# Patient Record
Sex: MALE | Race: WHITE | NOT HISPANIC OR LATINO | Employment: OTHER | URBAN - METROPOLITAN AREA
[De-identification: names, ages, dates, MRNs, and addresses within clinical notes are randomized per-mention and may not be internally consistent; named-entity substitution may affect disease eponyms.]

---

## 2024-06-14 ENCOUNTER — APPOINTMENT (EMERGENCY)
Dept: RADIOLOGY | Facility: HOSPITAL | Age: 64
DRG: 513 | End: 2024-06-14
Payer: COMMERCIAL

## 2024-06-14 ENCOUNTER — HOSPITAL ENCOUNTER (EMERGENCY)
Facility: HOSPITAL | Age: 64
Discharge: OTHER INSTITUTION | End: 2024-06-14
Attending: EMERGENCY MEDICINE
Payer: COMMERCIAL

## 2024-06-14 ENCOUNTER — HOSPITAL ENCOUNTER (INPATIENT)
Facility: HOSPITAL | Age: 64
LOS: 2 days | Discharge: HOME | DRG: 513 | End: 2024-06-16
Attending: SURGERY | Admitting: SURGERY
Payer: COMMERCIAL

## 2024-06-14 ENCOUNTER — APPOINTMENT (EMERGENCY)
Dept: RADIOLOGY | Facility: HOSPITAL | Age: 64
End: 2024-06-14
Payer: COMMERCIAL

## 2024-06-14 VITALS
WEIGHT: 188 LBS | HEART RATE: 102 BPM | BODY MASS INDEX: 27.85 KG/M2 | OXYGEN SATURATION: 94 % | TEMPERATURE: 98.7 F | DIASTOLIC BLOOD PRESSURE: 68 MMHG | RESPIRATION RATE: 20 BRPM | HEIGHT: 69 IN | SYSTOLIC BLOOD PRESSURE: 112 MMHG

## 2024-06-14 DIAGNOSIS — S63.055A: Primary | ICD-10-CM

## 2024-06-14 DIAGNOSIS — S61.502A: Primary | ICD-10-CM

## 2024-06-14 DIAGNOSIS — S63.055A CMC (CARPOMETACARPAL JOINT) DISLOCATION, LEFT, INITIAL ENCOUNTER: Primary | ICD-10-CM

## 2024-06-14 PROBLEM — I10 HTN (HYPERTENSION): Status: ACTIVE | Noted: 2024-06-14

## 2024-06-14 PROBLEM — F32.A DEPRESSION: Status: ACTIVE | Noted: 2024-06-14
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
## 2024-06-14 PROCEDURE — 96375 TX/PRO/DX INJ NEW DRUG ADDON: CPT

## 2024-06-14 PROCEDURE — NC001 PR NO CHARGE: Performed by: EMERGENCY MEDICINE

## 2024-06-14 PROCEDURE — 99222 1ST HOSP IP/OBS MODERATE 55: CPT | Performed by: SURGERY

## 2024-06-14 PROCEDURE — 99223 1ST HOSP IP/OBS HIGH 75: CPT | Performed by: ORTHOPAEDIC SURGERY

## 2024-06-14 PROCEDURE — 96365 THER/PROPH/DIAG IV INF INIT: CPT

## 2024-06-14 PROCEDURE — 73120 X-RAY EXAM OF HAND: CPT

## 2024-06-14 PROCEDURE — 73130 X-RAY EXAM OF HAND: CPT

## 2024-06-14 PROCEDURE — 96374 THER/PROPH/DIAG INJ IV PUSH: CPT

## 2024-06-14 PROCEDURE — 99284 EMERGENCY DEPT VISIT MOD MDM: CPT

## 2024-06-14 PROCEDURE — 96376 TX/PRO/DX INJ SAME DRUG ADON: CPT

## 2024-06-14 PROCEDURE — 26670 TREAT HAND DISLOCATION: CPT | Performed by: EMERGENCY MEDICINE

## 2024-06-14 PROCEDURE — 73090 X-RAY EXAM OF FOREARM: CPT

## 2024-06-14 PROCEDURE — 99285 EMERGENCY DEPT VISIT HI MDM: CPT | Performed by: EMERGENCY MEDICINE

## 2024-06-14 RX ORDER — HYDROMORPHONE HCL/PF 1 MG/ML
1 SYRINGE (ML) INJECTION ONCE
Status: COMPLETED | OUTPATIENT
Start: 2024-06-14 | End: 2024-06-14

## 2024-06-14 RX ORDER — ONDANSETRON 2 MG/ML
4 INJECTION INTRAMUSCULAR; INTRAVENOUS EVERY 4 HOURS PRN
Status: DISCONTINUED | OUTPATIENT
Start: 2024-06-14 | End: 2024-06-16 | Stop reason: HOSPADM

## 2024-06-14 RX ORDER — LORAZEPAM 2 MG/ML
0.5 INJECTION INTRAMUSCULAR ONCE
Status: DISCONTINUED | OUTPATIENT
Start: 2024-06-14 | End: 2024-06-14

## 2024-06-14 RX ORDER — LIDOCAINE HYDROCHLORIDE 10 MG/ML
10 INJECTION, SOLUTION EPIDURAL; INFILTRATION; INTRACAUDAL; PERINEURAL ONCE
Status: COMPLETED | OUTPATIENT
Start: 2024-06-14 | End: 2024-06-14

## 2024-06-14 RX ORDER — LORAZEPAM 2 MG/ML
0.5 INJECTION INTRAMUSCULAR ONCE
Status: COMPLETED | OUTPATIENT
Start: 2024-06-14 | End: 2024-06-14

## 2024-06-14 RX ORDER — LIDOCAINE HYDROCHLORIDE 10 MG/ML
20 INJECTION, SOLUTION EPIDURAL; INFILTRATION; INTRACAUDAL; PERINEURAL ONCE
Status: COMPLETED | OUTPATIENT
Start: 2024-06-14 | End: 2024-06-14

## 2024-06-14 RX ORDER — HYDROMORPHONE HCL IN WATER/PF 6 MG/30 ML
0.2 PATIENT CONTROLLED ANALGESIA SYRINGE INTRAVENOUS EVERY 4 HOURS PRN
Status: DISCONTINUED | OUTPATIENT
Start: 2024-06-14 | End: 2024-06-16 | Stop reason: HOSPADM

## 2024-06-14 RX ORDER — ACETAMINOPHEN 325 MG/1
650 TABLET ORAL EVERY 4 HOURS PRN
Status: DISCONTINUED | OUTPATIENT
Start: 2024-06-14 | End: 2024-06-16 | Stop reason: HOSPADM

## 2024-06-14 RX ORDER — OXYCODONE HYDROCHLORIDE 5 MG/1
5 TABLET ORAL EVERY 4 HOURS PRN
Status: DISCONTINUED | OUTPATIENT
Start: 2024-06-14 | End: 2024-06-16 | Stop reason: HOSPADM

## 2024-06-14 RX ORDER — CEFAZOLIN SODIUM 2 G/50ML
2000 SOLUTION INTRAVENOUS ONCE
Status: CANCELLED | OUTPATIENT
Start: 2024-06-14

## 2024-06-14 RX ORDER — CEFAZOLIN SODIUM 2 G/50ML
2000 SOLUTION INTRAVENOUS ONCE
Status: COMPLETED | OUTPATIENT
Start: 2024-06-14 | End: 2024-06-14

## 2024-06-14 RX ORDER — CEFAZOLIN SODIUM 2 G/50ML
2000 SOLUTION INTRAVENOUS EVERY 8 HOURS
Status: DISCONTINUED | OUTPATIENT
Start: 2024-06-14 | End: 2024-06-16 | Stop reason: HOSPADM

## 2024-06-14 RX ORDER — HYDROMORPHONE HCL/PF 1 MG/ML
0.5 SYRINGE (ML) INJECTION ONCE
Status: COMPLETED | OUTPATIENT
Start: 2024-06-14 | End: 2024-06-14

## 2024-06-14 RX ADMIN — LORAZEPAM 0.5 MG: 2 INJECTION INTRAMUSCULAR; INTRAVENOUS at 18:12

## 2024-06-14 RX ADMIN — SODIUM CHLORIDE 1000 ML: 0.9 INJECTION, SOLUTION INTRAVENOUS at 16:24

## 2024-06-14 RX ADMIN — HYDROMORPHONE HYDROCHLORIDE 0.5 MG: 1 INJECTION, SOLUTION INTRAMUSCULAR; INTRAVENOUS; SUBCUTANEOUS at 18:40

## 2024-06-14 RX ADMIN — LIDOCAINE HYDROCHLORIDE 10 ML: 10 INJECTION, SOLUTION EPIDURAL; INFILTRATION; INTRACAUDAL; PERINEURAL at 22:04

## 2024-06-14 RX ADMIN — LIDOCAINE HYDROCHLORIDE 10 ML: 10 INJECTION, SOLUTION EPIDURAL; INFILTRATION; INTRACAUDAL; PERINEURAL at 21:11

## 2024-06-14 RX ADMIN — CEFAZOLIN SODIUM 2000 MG: 2 SOLUTION INTRAVENOUS at 16:24

## 2024-06-14 RX ADMIN — LIDOCAINE HYDROCHLORIDE 10 ML: 10 INJECTION, SOLUTION EPIDURAL; INFILTRATION; INTRACAUDAL; PERINEURAL at 20:22

## 2024-06-14 RX ADMIN — LIDOCAINE HYDROCHLORIDE 20 ML: 10 INJECTION, SOLUTION EPIDURAL; INFILTRATION; INTRACAUDAL at 17:08

## 2024-06-14 RX ADMIN — HYDROMORPHONE HYDROCHLORIDE 0.2 MG: 0.2 INJECTION, SOLUTION INTRAMUSCULAR; INTRAVENOUS; SUBCUTANEOUS at 20:26

## 2024-06-14 RX ADMIN — HYDROMORPHONE HYDROCHLORIDE 1 MG: 1 INJECTION, SOLUTION INTRAMUSCULAR; INTRAVENOUS; SUBCUTANEOUS at 17:07

## 2024-06-14 RX ADMIN — HYDROMORPHONE HYDROCHLORIDE 1 MG: 1 INJECTION, SOLUTION INTRAMUSCULAR; INTRAVENOUS; SUBCUTANEOUS at 16:22
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
## 2024-06-15 ENCOUNTER — ANESTHESIA (INPATIENT)
Dept: PERIOP | Facility: HOSPITAL | Age: 64
DRG: 513 | End: 2024-06-15
Payer: COMMERCIAL

## 2024-06-15 ENCOUNTER — APPOINTMENT (INPATIENT)
Dept: RADIOLOGY | Facility: HOSPITAL | Age: 64
DRG: 513 | End: 2024-06-15
Payer: COMMERCIAL

## 2024-06-15 ENCOUNTER — ANESTHESIA EVENT (INPATIENT)
Dept: PERIOP | Facility: HOSPITAL | Age: 64
DRG: 513 | End: 2024-06-15
Payer: COMMERCIAL

## 2024-06-15 LAB
ABO GROUP BLD: NORMAL
ABO GROUP BLD: NORMAL
ANION GAP SERPL CALCULATED.3IONS-SCNC: 7 MMOL/L (ref 4–13)
ANION GAP SERPL CALCULATED.3IONS-SCNC: 8 MMOL/L (ref 4–13)
APTT PPP: 22 SECONDS (ref 23–37)
BASOPHILS # BLD AUTO: 0.02 THOUSANDS/ÂΜL (ref 0–0.1)
BASOPHILS # BLD AUTO: 0.02 THOUSANDS/ÂΜL (ref 0–0.1)
BASOPHILS NFR BLD AUTO: 0 % (ref 0–1)
BASOPHILS NFR BLD AUTO: 0 % (ref 0–1)
BLD GP AB SCN SERPL QL: NEGATIVE
BUN SERPL-MCNC: 13 MG/DL (ref 5–25)
BUN SERPL-MCNC: 14 MG/DL (ref 5–25)
CALCIUM SERPL-MCNC: 7.9 MG/DL (ref 8.4–10.2)
CALCIUM SERPL-MCNC: 8.1 MG/DL (ref 8.4–10.2)
CHLORIDE SERPL-SCNC: 101 MMOL/L (ref 96–108)
CHLORIDE SERPL-SCNC: 102 MMOL/L (ref 96–108)
CO2 SERPL-SCNC: 24 MMOL/L (ref 21–32)
CO2 SERPL-SCNC: 24 MMOL/L (ref 21–32)
CREAT SERPL-MCNC: 0.95 MG/DL (ref 0.6–1.3)
CREAT SERPL-MCNC: 1.05 MG/DL (ref 0.6–1.3)
EOSINOPHIL # BLD AUTO: 0 THOUSAND/ÂΜL (ref 0–0.61)
EOSINOPHIL # BLD AUTO: 0 THOUSAND/ÂΜL (ref 0–0.61)
EOSINOPHIL NFR BLD AUTO: 0 % (ref 0–6)
EOSINOPHIL NFR BLD AUTO: 0 % (ref 0–6)
ERYTHROCYTE [DISTWIDTH] IN BLOOD BY AUTOMATED COUNT: 12.7 % (ref 11.6–15.1)
ERYTHROCYTE [DISTWIDTH] IN BLOOD BY AUTOMATED COUNT: 12.9 % (ref 11.6–15.1)
GFR SERPL CREATININE-BSD FRML MDRD: 75 ML/MIN/1.73SQ M
GFR SERPL CREATININE-BSD FRML MDRD: 84 ML/MIN/1.73SQ M
GLUCOSE SERPL-MCNC: 119 MG/DL (ref 65–140)
GLUCOSE SERPL-MCNC: 153 MG/DL (ref 65–140)
HCT VFR BLD AUTO: 30.2 % (ref 36.5–49.3)
HCT VFR BLD AUTO: 31.2 % (ref 36.5–49.3)
HGB BLD-MCNC: 10.5 G/DL (ref 12–17)
HGB BLD-MCNC: 11 G/DL (ref 12–17)
IMM GRANULOCYTES # BLD AUTO: 0.03 THOUSAND/UL (ref 0–0.2)
IMM GRANULOCYTES # BLD AUTO: 0.05 THOUSAND/UL (ref 0–0.2)
IMM GRANULOCYTES NFR BLD AUTO: 0 % (ref 0–2)
IMM GRANULOCYTES NFR BLD AUTO: 1 % (ref 0–2)
INR PPP: 1.03 (ref 0.84–1.19)
LYMPHOCYTES # BLD AUTO: 0.5 THOUSANDS/ÂΜL (ref 0.6–4.47)
LYMPHOCYTES # BLD AUTO: 0.69 THOUSANDS/ÂΜL (ref 0.6–4.47)
LYMPHOCYTES NFR BLD AUTO: 5 % (ref 14–44)
LYMPHOCYTES NFR BLD AUTO: 8 % (ref 14–44)
MCH RBC QN AUTO: 33.5 PG (ref 26.8–34.3)
MCH RBC QN AUTO: 34.3 PG (ref 26.8–34.3)
MCHC RBC AUTO-ENTMCNC: 34.8 G/DL (ref 31.4–37.4)
MCHC RBC AUTO-ENTMCNC: 35.3 G/DL (ref 31.4–37.4)
MCV RBC AUTO: 97 FL (ref 82–98)
MCV RBC AUTO: 97 FL (ref 82–98)
MONOCYTES # BLD AUTO: 0.73 THOUSAND/ÂΜL (ref 0.17–1.22)
MONOCYTES # BLD AUTO: 0.87 THOUSAND/ÂΜL (ref 0.17–1.22)
MONOCYTES NFR BLD AUTO: 10 % (ref 4–12)
MONOCYTES NFR BLD AUTO: 8 % (ref 4–12)
NEUTROPHILS # BLD AUTO: 6.94 THOUSANDS/ÂΜL (ref 1.85–7.62)
NEUTROPHILS # BLD AUTO: 8.03 THOUSANDS/ÂΜL (ref 1.85–7.62)
NEUTS SEG NFR BLD AUTO: 82 % (ref 43–75)
NEUTS SEG NFR BLD AUTO: 86 % (ref 43–75)
NRBC BLD AUTO-RTO: 0 /100 WBCS
NRBC BLD AUTO-RTO: 0 /100 WBCS
PLATELET # BLD AUTO: 172 THOUSANDS/UL (ref 149–390)
PLATELET # BLD AUTO: 173 THOUSANDS/UL (ref 149–390)
PMV BLD AUTO: 10.1 FL (ref 8.9–12.7)
PMV BLD AUTO: 9.1 FL (ref 8.9–12.7)
POTASSIUM SERPL-SCNC: 4.2 MMOL/L (ref 3.5–5.3)
POTASSIUM SERPL-SCNC: 4.5 MMOL/L (ref 3.5–5.3)
PROTHROMBIN TIME: 13.4 SECONDS (ref 11.6–14.5)
RBC # BLD AUTO: 3.13 MILLION/UL (ref 3.88–5.62)
RBC # BLD AUTO: 3.21 MILLION/UL (ref 3.88–5.62)
RH BLD: NEGATIVE
RH BLD: NEGATIVE
SODIUM SERPL-SCNC: 133 MMOL/L (ref 135–147)
SODIUM SERPL-SCNC: 133 MMOL/L (ref 135–147)
SPECIMEN EXPIRATION DATE: NORMAL
WBC # BLD AUTO: 8.55 THOUSAND/UL (ref 4.31–10.16)
WBC # BLD AUTO: 9.33 THOUSAND/UL (ref 4.31–10.16)

## 2024-06-15 PROCEDURE — 86850 RBC ANTIBODY SCREEN: CPT

## 2024-06-15 PROCEDURE — 86900 BLOOD TYPING SEROLOGIC ABO: CPT

## 2024-06-15 PROCEDURE — 99232 SBSQ HOSP IP/OBS MODERATE 35: CPT | Performed by: PHYSICIAN ASSISTANT

## 2024-06-15 PROCEDURE — 80048 BASIC METABOLIC PNL TOTAL CA: CPT

## 2024-06-15 PROCEDURE — NC001 PR NO CHARGE: Performed by: ORTHOPAEDIC SURGERY

## 2024-06-15 PROCEDURE — 26686 TREAT HAND DISLOCATION: CPT | Performed by: ORTHOPAEDIC SURGERY

## 2024-06-15 PROCEDURE — C1713 ANCHOR/SCREW BN/BN,TIS/BN: HCPCS | Performed by: ORTHOPAEDIC SURGERY

## 2024-06-15 PROCEDURE — 85025 COMPLETE CBC W/AUTO DIFF WBC: CPT

## 2024-06-15 PROCEDURE — 85610 PROTHROMBIN TIME: CPT

## 2024-06-15 PROCEDURE — 0PSQ04Z REPOSITION LEFT METACARPAL WITH INTERNAL FIXATION DEVICE, OPEN APPROACH: ICD-10-PCS | Performed by: ORTHOPAEDIC SURGERY

## 2024-06-15 PROCEDURE — 85730 THROMBOPLASTIN TIME PARTIAL: CPT

## 2024-06-15 PROCEDURE — 73130 X-RAY EXAM OF HAND: CPT

## 2024-06-15 PROCEDURE — 0PSN04Z REPOSITION LEFT CARPAL WITH INTERNAL FIXATION DEVICE, OPEN APPROACH: ICD-10-PCS | Performed by: ORTHOPAEDIC SURGERY

## 2024-06-15 PROCEDURE — 86901 BLOOD TYPING SEROLOGIC RH(D): CPT

## 2024-06-15 PROCEDURE — 36415 COLL VENOUS BLD VENIPUNCTURE: CPT

## 2024-06-15 PROCEDURE — 11010 DEBRIDE SKIN AT FX SITE: CPT | Performed by: ORTHOPAEDIC SURGERY

## 2024-06-15 DEVICE — K-WIRE DIAMOND POINT BOTH ENDS                                    .062 X 5: Type: IMPLANTABLE DEVICE | Site: HAND | Status: FUNCTIONAL

## 2024-06-15 RX ORDER — PROPOFOL 10 MG/ML
INJECTION, EMULSION INTRAVENOUS AS NEEDED
Status: DISCONTINUED | OUTPATIENT
Start: 2024-06-15 | End: 2024-06-15

## 2024-06-15 RX ORDER — ONDANSETRON 2 MG/ML
INJECTION INTRAMUSCULAR; INTRAVENOUS AS NEEDED
Status: DISCONTINUED | OUTPATIENT
Start: 2024-06-15 | End: 2024-06-15

## 2024-06-15 RX ORDER — ENOXAPARIN SODIUM 100 MG/ML
30 INJECTION SUBCUTANEOUS EVERY 12 HOURS SCHEDULED
Status: DISCONTINUED | OUTPATIENT
Start: 2024-06-15 | End: 2024-06-16 | Stop reason: HOSPADM

## 2024-06-15 RX ORDER — LIDOCAINE HYDROCHLORIDE 10 MG/ML
INJECTION, SOLUTION EPIDURAL; INFILTRATION; INTRACAUDAL; PERINEURAL AS NEEDED
Status: DISCONTINUED | OUTPATIENT
Start: 2024-06-15 | End: 2024-06-15

## 2024-06-15 RX ORDER — FENTANYL CITRATE/PF 50 MCG/ML
50 SYRINGE (ML) INJECTION
Status: DISCONTINUED | OUTPATIENT
Start: 2024-06-15 | End: 2024-06-15 | Stop reason: HOSPADM

## 2024-06-15 RX ORDER — SODIUM CHLORIDE, SODIUM LACTATE, POTASSIUM CHLORIDE, CALCIUM CHLORIDE 600; 310; 30; 20 MG/100ML; MG/100ML; MG/100ML; MG/100ML
INJECTION, SOLUTION INTRAVENOUS CONTINUOUS PRN
Status: DISCONTINUED | OUTPATIENT
Start: 2024-06-15 | End: 2024-06-15

## 2024-06-15 RX ORDER — MIDAZOLAM HYDROCHLORIDE 2 MG/2ML
INJECTION, SOLUTION INTRAMUSCULAR; INTRAVENOUS AS NEEDED
Status: DISCONTINUED | OUTPATIENT
Start: 2024-06-15 | End: 2024-06-15

## 2024-06-15 RX ORDER — PROMETHAZINE HYDROCHLORIDE 25 MG/ML
25 INJECTION, SOLUTION INTRAMUSCULAR; INTRAVENOUS ONCE AS NEEDED
Status: DISCONTINUED | OUTPATIENT
Start: 2024-06-15 | End: 2024-06-15 | Stop reason: HOSPADM

## 2024-06-15 RX ORDER — KETOROLAC TROMETHAMINE 10 MG/1
10 TABLET, FILM COATED ORAL EVERY 6 HOURS PRN
Qty: 12 TABLET | Refills: 0 | Status: SHIPPED | OUTPATIENT
Start: 2024-06-15 | End: 2024-06-18

## 2024-06-15 RX ORDER — ONDANSETRON 2 MG/ML
4 INJECTION INTRAMUSCULAR; INTRAVENOUS ONCE AS NEEDED
Status: DISCONTINUED | OUTPATIENT
Start: 2024-06-15 | End: 2024-06-15 | Stop reason: HOSPADM

## 2024-06-15 RX ORDER — ACETAMINOPHEN 10 MG/ML
INJECTION, SOLUTION INTRAVENOUS AS NEEDED
Status: DISCONTINUED | OUTPATIENT
Start: 2024-06-15 | End: 2024-06-15

## 2024-06-15 RX ORDER — FENTANYL CITRATE 50 UG/ML
INJECTION, SOLUTION INTRAMUSCULAR; INTRAVENOUS AS NEEDED
Status: DISCONTINUED | OUTPATIENT
Start: 2024-06-15 | End: 2024-06-15

## 2024-06-15 RX ORDER — HYDROMORPHONE HCL/PF 1 MG/ML
SYRINGE (ML) INJECTION AS NEEDED
Status: DISCONTINUED | OUTPATIENT
Start: 2024-06-15 | End: 2024-06-15

## 2024-06-15 RX ORDER — TRAZODONE HYDROCHLORIDE 50 MG/1
50 TABLET ORAL
Status: DISCONTINUED | OUTPATIENT
Start: 2024-06-15 | End: 2024-06-16 | Stop reason: HOSPADM

## 2024-06-15 RX ORDER — PHENYLEPHRINE HCL IN 0.9% NACL 1 MG/10 ML
SYRINGE (ML) INTRAVENOUS AS NEEDED
Status: DISCONTINUED | OUTPATIENT
Start: 2024-06-15 | End: 2024-06-15

## 2024-06-15 RX ORDER — CEFAZOLIN SODIUM 1 G/3ML
INJECTION, POWDER, FOR SOLUTION INTRAMUSCULAR; INTRAVENOUS AS NEEDED
Status: DISCONTINUED | OUTPATIENT
Start: 2024-06-15 | End: 2024-06-15

## 2024-06-15 RX ORDER — DEXAMETHASONE SODIUM PHOSPHATE 10 MG/ML
INJECTION, SOLUTION INTRAMUSCULAR; INTRAVENOUS AS NEEDED
Status: DISCONTINUED | OUTPATIENT
Start: 2024-06-15 | End: 2024-06-15

## 2024-06-15 RX ORDER — BUPIVACAINE HYDROCHLORIDE 5 MG/ML
INJECTION, SOLUTION EPIDURAL; INTRACAUDAL AS NEEDED
Status: DISCONTINUED | OUTPATIENT
Start: 2024-06-15 | End: 2024-06-15 | Stop reason: HOSPADM

## 2024-06-15 RX ORDER — HYDROMORPHONE HCL/PF 1 MG/ML
0.5 SYRINGE (ML) INJECTION
Status: DISCONTINUED | OUTPATIENT
Start: 2024-06-15 | End: 2024-06-15 | Stop reason: HOSPADM

## 2024-06-15 RX ORDER — KETOROLAC TROMETHAMINE 30 MG/ML
30 INJECTION, SOLUTION INTRAMUSCULAR; INTRAVENOUS ONCE
Status: COMPLETED | OUTPATIENT
Start: 2024-06-15 | End: 2024-06-15

## 2024-06-15 RX ADMIN — HYDROMORPHONE HYDROCHLORIDE 0.25 MG: 1 INJECTION, SOLUTION INTRAMUSCULAR; INTRAVENOUS; SUBCUTANEOUS at 12:30

## 2024-06-15 RX ADMIN — HYDROMORPHONE HYDROCHLORIDE 0.25 MG: 1 INJECTION, SOLUTION INTRAMUSCULAR; INTRAVENOUS; SUBCUTANEOUS at 11:53

## 2024-06-15 RX ADMIN — SODIUM CHLORIDE, SODIUM LACTATE, POTASSIUM CHLORIDE, AND CALCIUM CHLORIDE 1000 ML: .6; .31; .03; .02 INJECTION, SOLUTION INTRAVENOUS at 22:32

## 2024-06-15 RX ADMIN — CEFAZOLIN SODIUM 2000 MG: 2 SOLUTION INTRAVENOUS at 21:44

## 2024-06-15 RX ADMIN — DEXAMETHASONE SODIUM PHOSPHATE 10 MG: 10 INJECTION, SOLUTION INTRAMUSCULAR; INTRAVENOUS at 11:27

## 2024-06-15 RX ADMIN — FENTANYL CITRATE 50 MCG: 50 INJECTION INTRAMUSCULAR; INTRAVENOUS at 11:46

## 2024-06-15 RX ADMIN — Medication 100 MCG: at 12:55

## 2024-06-15 RX ADMIN — ENOXAPARIN SODIUM 30 MG: 30 INJECTION SUBCUTANEOUS at 20:32

## 2024-06-15 RX ADMIN — LIDOCAINE HYDROCHLORIDE 50 MG: 10 INJECTION, SOLUTION EPIDURAL; INFILTRATION; INTRACAUDAL; PERINEURAL at 11:21

## 2024-06-15 RX ADMIN — HYDROMORPHONE HYDROCHLORIDE 0.2 MG: 0.2 INJECTION, SOLUTION INTRAMUSCULAR; INTRAVENOUS; SUBCUTANEOUS at 04:13

## 2024-06-15 RX ADMIN — FENTANYL CITRATE 50 MCG: 50 INJECTION INTRAMUSCULAR; INTRAVENOUS at 11:28

## 2024-06-15 RX ADMIN — PROPOFOL 200 MG: 10 INJECTION, EMULSION INTRAVENOUS at 11:21

## 2024-06-15 RX ADMIN — HYDROMORPHONE HYDROCHLORIDE 0.25 MG: 1 INJECTION, SOLUTION INTRAMUSCULAR; INTRAVENOUS; SUBCUTANEOUS at 12:44

## 2024-06-15 RX ADMIN — KETOROLAC TROMETHAMINE 30 MG: 30 INJECTION, SOLUTION INTRAMUSCULAR; INTRAVENOUS at 17:47

## 2024-06-15 RX ADMIN — ONDANSETRON 4 MG: 2 INJECTION INTRAMUSCULAR; INTRAVENOUS at 11:13

## 2024-06-15 RX ADMIN — CEFAZOLIN 2000 MG: 1 INJECTION, POWDER, FOR SOLUTION INTRAMUSCULAR; INTRAVENOUS at 11:32

## 2024-06-15 RX ADMIN — TRAZODONE HYDROCHLORIDE 50 MG: 50 TABLET ORAL at 01:10

## 2024-06-15 RX ADMIN — OXYCODONE HYDROCHLORIDE 5 MG: 5 TABLET ORAL at 07:31

## 2024-06-15 RX ADMIN — Medication 100 MCG: at 11:44

## 2024-06-15 RX ADMIN — MIDAZOLAM 2 MG: 1 INJECTION INTRAMUSCULAR; INTRAVENOUS at 11:13

## 2024-06-15 RX ADMIN — CEFAZOLIN SODIUM 2000 MG: 2 SOLUTION INTRAVENOUS at 01:11

## 2024-06-15 RX ADMIN — SODIUM CHLORIDE, SODIUM LACTATE, POTASSIUM CHLORIDE, AND CALCIUM CHLORIDE: .6; .31; .03; .02 INJECTION, SOLUTION INTRAVENOUS at 11:17

## 2024-06-15 RX ADMIN — HYDROMORPHONE HYDROCHLORIDE 0.2 MG: 0.2 INJECTION, SOLUTION INTRAMUSCULAR; INTRAVENOUS; SUBCUTANEOUS at 00:01

## 2024-06-15 RX ADMIN — Medication 100 MCG: at 11:40

## 2024-06-15 RX ADMIN — ACETAMINOPHEN 1000 MG: 10 INJECTION INTRAVENOUS at 11:04

## 2024-06-15 RX ADMIN — TRAZODONE HYDROCHLORIDE 50 MG: 50 TABLET ORAL at 20:31

## 2024-06-15 RX ADMIN — CEFAZOLIN SODIUM 2000 MG: 2 SOLUTION INTRAVENOUS at 09:33

## 2024-06-15 RX ADMIN — HYDROMORPHONE HYDROCHLORIDE 0.25 MG: 1 INJECTION, SOLUTION INTRAMUSCULAR; INTRAVENOUS; SUBCUTANEOUS at 12:04

## 2024-06-15 NOTE — PLAN OF CARE
Problem: PAIN - ADULT  Goal: Verbalizes/displays adequate comfort level or baseline comfort level  Description: Interventions:  - Encourage patient to monitor pain and request assistance  - Assess pain using appropriate pain scale  - Administer analgesics based on type and severity of pain and evaluate response  - Implement non-pharmacological measures as appropriate and evaluate response  - Consider cultural and social influences on pain and pain management  - Notify physician/advanced practitioner if interventions unsuccessful or patient reports new pain  Outcome: Progressing     Problem: INFECTION - ADULT  Goal: Absence or prevention of progression during hospitalization  Description: INTERVENTIONS:  - Assess and monitor for signs and symptoms of infection  - Monitor lab/diagnostic results  - Monitor all insertion sites, i.e. indwelling lines, tubes, and drains  - Monitor endotracheal if appropriate and nasal secretions for changes in amount and color  - [place] appropriate cooling/warming therapies per order  - Administer medications as ordered  - Instruct and encourage patient and family to use good hand hygiene technique  - Identify and instruct in appropriate isolation precautions for identified infection/condition  Outcome: Progressing  Goal: Absence of fever/infection during neutropenic period  Description: INTERVENTIONS:  - Monitor WBC    Outcome: Progressing     Problem: SAFETY ADULT  Goal: Patient will remain free of falls  Description: INTERVENTIONS:  - Educate patient/family on patient safety including physical limitations  - Instruct patient to call for assistance with activity   - Consult OT/PT to assist with strengthening/mobility   - Keep Call bell within reach  - Keep bed low and locked with side rails adjusted as appropriate  - Keep care items and personal belongings within reach  - Initiate and maintain comfort rounds  - Make Fall Risk Sign visible to staff  - Offer Toileting every 2 Hours,  in advance of need  - Initiate/Maintain bed/chair alarm  - Obtain necessary fall risk management equipment: bed/chair alarm, non-slip socks, etc.  - Apply yellow socks and bracelet for high fall risk patients  - Consider moving patient to room near nurses station  Outcome: Progressing  Goal: Maintain or return to baseline ADL function  Description: INTERVENTIONS:  -  Assess patient's ability to carry out ADLs; assess patient's baseline for ADL function and identify physical deficits which impact ability to perform ADLs (bathing, care of mouth/teeth, toileting, grooming, dressing, etc.)  - Assess/evaluate cause of self-care deficits   - Assess range of motion  - Assess patient's mobility; develop plan if impaired  - Assess patient's need for assistive devices and provide as appropriate  - Encourage maximum independence but intervene and supervise when necessary  - Involve family in performance of ADLs  - Assess for home care needs following discharge   - Consider OT consult to assist with ADL evaluation and planning for discharge  - Provide patient education as appropriate  Outcome: Progressing  Goal: Maintains/Returns to pre admission functional level  Description: INTERVENTIONS:  - Perform AM-PAC 6 Click Basic Mobility/ Daily Activity assessment daily.  - Set and communicate daily mobility goal to care team and patient/family/caregiver.   - Collaborate with rehabilitation services on mobility goals if consulted  - Perform Range of Motion 3 times a day.  - Reposition patient every 2 hours.  - Dangle patient 3 times a day  - Stand patient 3 times a day  - Ambulate patient 3 times a day  - Out of bed to chair 3 times a day   - Out of bed for meals 3 times a day  - Out of bed for toileting  - Record patient progress and toleration of activity level   Outcome: Progressing     Problem: DISCHARGE PLANNING  Goal: Discharge to home or other facility with appropriate resources  Description: INTERVENTIONS:  - Identify  barriers to discharge w/patient and caregiver  - Arrange for needed discharge resources and transportation as appropriate  - Identify discharge learning needs (meds, wound care, etc.)  - Arrange for interpretive services to assist at discharge as needed  - Refer to Case Management Department for coordinating discharge planning if the patient needs post-hospital services based on physician/advanced practitioner order or complex needs related to functional status, cognitive ability, or social support system  Outcome: Progressing     Problem: Knowledge Deficit  Goal: Patient/family/caregiver demonstrates understanding of disease process, treatment plan, medications, and discharge instructions  Description: Complete learning assessment and assess knowledge base.  Interventions:  - Provide teaching at level of understanding  - Provide teaching via preferred learning methods  Outcome: Progressing     Problem: SKIN/TISSUE INTEGRITY - ADULT  Goal: Skin Integrity remains intact(Skin Breakdown Prevention)  Description: Assess:  -Perform Chris assessment every shift  -Clean and moisturize skin every shift  -Inspect skin when repositioning, toileting, and assisting with ADLS  -Assess under medical devices such as lea every 2 hours  -Assess extremities for adequate circulation and sensation     Bed Management:  -Have minimal linens on bed & keep smooth, unwrinkled  -Change linens as needed when moist or perspiring  -Avoid sitting or lying in one position for more than 2 hours while in bed  -Keep HOB at 45 degrees     Toileting:  -Offer bedside commode  -Assess for incontinence every 2 hours  -Use incontinent care products after each incontinent episode such as moisture barrier cream    Activity:  -Mobilize patient 3 times a day  -Encourage activity and walks on unit  -Encourage or provide ROM exercises   -Turn and reposition patient every 2 Hours  -Use appropriate equipment to lift or move patient in bed  -Instruct/ Assist  with weight shifting every 1 hour when out of bed in chair  -Consider limitation of chair time 2 hour intervals    Skin Care:  -Avoid use of baby powder, tape, friction and shearing, hot water or constrictive clothing  -Relieve pressure over bony prominences using allevin foam dressings  -Do not massage red bony areas    Next Steps:  -Teach patient strategies to minimize risks such as calling for assistance   -Consider consults to  interdisciplinary teams such as PT/OT  Outcome: Progressing  Goal: Incision(s), wounds(s) or drain site(s) healing without S/S of infection  Description: INTERVENTIONS  - Assess and document dressing, incision, wound bed, drain sites and surrounding tissue  - Provide patient and family education  - Perform skin care/dressing changes every shift  Outcome: Progressing  Goal: Pressure injury heals and does not worsen  Description: Interventions:  - Implement low air loss mattress or specialty surface (Criteria met)  - Apply silicone foam dressing  - Instruct/assist with weight shifting every 30 minutes when in chair   - Limit chair time to 2 hour intervals  - Use special pressure reducing interventions such as chair cushion when in chair   - Apply fecal or urinary incontinence containment device   - Perform passive or active ROM every 2 hours  - Turn and reposition patient & offload bony prominences every 2 hours   - Utilize friction reducing device or surface for transfers   - Consider consults to  interdisciplinary teams such as PT/OT  - Use incontinent care products after each incontinent episode such as moisture barrier cream  - Consider nutrition services referral as needed  Outcome: Progressing     Problem: MUSCULOSKELETAL - ADULT  Goal: Maintain or return mobility to safest level of function  Description: INTERVENTIONS:  - Assess patient's ability to carry out ADLs; assess patient's baseline for ADL function and identify physical deficits which impact ability to perform ADLs (bathing,  care of mouth/teeth, toileting, grooming, dressing, etc.)  - Assess/evaluate cause of self-care deficits   - Assess range of motion  - Assess patient's mobility  - Assess patient's need for assistive devices and provide as appropriate  - Encourage maximum independence but intervene and supervise when necessary  - Involve family in performance of ADLs  - Assess for home care needs following discharge   - Consider OT consult to assist with ADL evaluation and planning for discharge  - Provide patient education as appropriate  Outcome: Progressing  Goal: Maintain proper alignment of affected body part  Description: INTERVENTIONS:  - Support, maintain and protect limb and body alignment  - Provide patient/ family with appropriate education  Outcome: Progressing

## 2024-06-15 NOTE — ASSESSMENT & PLAN NOTE
- Patient with chronic history of depression.  - Continue current medication regimen and resume home medication therapy as appropriate.  - Outpatient follow-up routine.
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
## 2024-06-15 NOTE — OP NOTE
OPERATIVE REPORT  PATIENT NAME: Misha Gonzalez    :  1960  MRN: [id number]  Pt Location: [facility] OR ROOM 04    SURGERY DATE: 6/15/2024    Surgeons and Role:     * Ngoc Campos MD - Primary     * Henry Gudino MD - Assisting     * Blane Peterson MD - Assisting    Preop Diagnosis:  CMC (carpometacarpal joint) dislocation, left, initial encounter [S63.365L] - long, ring, and small CMC joint dislocations    Lacerations x 2 palm of hand, radial aspect    Post-Op Diagnosis Codes:     * CMC (carpometacarpal joint) dislocation, left, initial encounter [S63.417Z] - long, ring, and small CMC joint dislocations    Procedure(s):  Left - DEBRIDEMENT HAND/FINGER (WASH OUT)  Left - OPEN REDUCTION W/ INTERNAL FIXATION (ORIF) HAND  Left - DEBRIDEMENT WOUND (WASH OUT)    Specimen(s):  * No specimens in log *    Estimated Blood Loss:   25 mL    Drains:  * No LDAs found *    Anesthesia Type:   Choice    Operative Indications:  CMC (carpometacarpal joint) dislocation, left, initial encounter [S63.052Y]  Lacerations x 2 palm of left hand    Operative Findings:  Neurovascular structures intact  Flexor tendons to index, long, and ring fingers intact.  Tendon sheaths intact.   Lacerations x 2. Laceration #1 transverse across thenar eminence measuring 4-5 cm in length, deep, involving thenar musculature.  Muscle viable.  Laceration #2 transverse, measuring 4-5 cm in length, approximately 1.5 cm distal to Laceration #1 at level of distal palmar flexion crease.  No interposed tissue between long and ring finger MC bases.  Mild venous oozing following evacuation of hematoma from distal laceration  Capillary refill brisk following procedure  Normal cascade of hand restored following reduction and pinning  Compartments soft.      Complications:   None    Procedure and Technique:  The patient was correctly identified in the preanesthesia holding area.  The operative site was identified and marked with a marker.  We reviewed the informed  consent which included the planned surgical procedure, risk, benefits, alternatives, as well as, postoperative care expectations.  Questions were answered to the patient satisfaction.  The patient voluntarily signed informed consent.    The patient was taken back to the operating room.  The patient remained on the gurney with a hand table attached to the gurney.  The patient was sedated and the anesthesiologist secured the airway.  Antibiotics were administered intravenously.  This was a third dose of Ancef received by the patient.  I applied tourniquet to the operative extremity. I removed the splint and bandages.sutures were removed from the wounds on the palm of the hand.  The lacerations were visually inspected.  There was no active bleeding from the lacerations.  Lacerations were described above in the operative findings.  There were also 2 small superficial abrasions on the dorsum of the hand.  These abrasions required nothing more than simple wound care.      The operative extremity was prepped and draped in a sterile fashion.  A timeout was performed.  I used a marker to plan the surgical incision on the dorsum of the hand overlying the CMC joints.  I used fluoroscopy to confirm the position of the incision.  First, I began irrigation and excisional debridement of nonviable skin and subcutaneous tissue.  Muscle tissue was viable.  The wounds were irrigated using normal saline delivered through cystoscopy tubing.  The wounds appeared clean with no gross contamination or foreign body.  I inspected each wound.  Findings are listed in the operative findings.      A closed reduction was performed under fluoroscopic guidance.  There was a noted mild diastases between the long and ring finger metacarpals, therefore I made a decision to make the incision on the dorsum of the hand to directly visualize the base of the long and ring finger metacarpals.   I elevated the upper extremity, applied an Esmarch, and inflated  the tourniquet to 250 mmHg.  I used a #15 blade to make the skin incision.  Careful dissection was performed using Littler scissors.  Under direct visualization I confirmed that there was no true diastases or interposed tissue between the long and the ring finger metacarpal bases.  Once this was confirmed, I decided to move forward with pin fixation of the CMC joint to prevent potential subluxation or dislocation.  0.062 K wire was inserted in the long finger metacarpal and into the capitate under fluoroscopic guidance.  The K wire engage the dorsal aspect of the capitate.  I was not able to manipulate the K wire in a more volar fashion given the size of the intramedullary canal of the long finger metacarpal.  Next, I inserted 2 additional 0.062 K wires.  The first K wire was inserted into the small finger metacarpal base and advanced into the ring, long, and index finger metacarpal bases.  The second K wire was inserted into the small finger metacarpal base and advanced in an oblique fashion into the ring finger metacarpal base and long finger metacarpal base and capitate.  Fluoroscopic images were taken to include PA, lateral, and oblique views.  CMC joint reductions were satisfactory and stable.  K wires appropriately engaged the metacarpal bases and CMC joints to prevent subluxation or dislocation.  The k-wires were bent, trimmed, and capped.  Final fluoroscopic images confirmed that CMC joint position and k-wire positions were unchanged following bending, trimming, and capping of k-wires.     My attention was returned to the lacerations and further inspection and repeat irrigation with copious amount of normal saline delivered via cysto tubing.    The tourniquet  was deflated at 51 minutes.  Hemostasis was achieved using electrocautery during the case as needed and direct pressure following closure.  I approximated the skin of each laceration and the dorsal incisional wound using 4-0 nylon in horizontal  mattress pattern. A median nerve block was performed for post-operative pain management.  A sterile dressing and a volar splint were applied to the operative extremity.  The patient tolerated the procedure well and was taken to the postanesthesia care unit in stable condition.  I, Ngoc Campos, performed the entire procedure.  Residents were involved in the surgical case and performed portions of the procedure under my direct supervision.     Postoperative plan:   The patient was instructed on activity limitations, elevation, and use of ice for pain management.    Pain management will include the use of over-the-counter Tylenol and ibuprofen or toradol.  The patient was given instructions on appropriate dosing and timing for postoperative use of Tylenol and ibuprofen or toradol.    The patient will follow-up in 10 days for removal of the splint, sutures, and to receive splint fabricated by the hand therapist as well as a instructions for pin care and home exercise program.     Patient Disposition:  PACU         SIGNATURE: Ngoc Campos MD  DATE: Sara 15, 2024  TIME: 1:05 PM

## 2024-06-15 NOTE — ANESTHESIA POSTPROCEDURE EVALUATION
Post-Op Assessment Note    CV Status:  Stable    Pain management: adequate       Mental Status:  Alert and awake   Hydration Status:  Euvolemic   PONV Controlled:  Controlled   Airway Patency:  Patent     Post Op Vitals Reviewed: Yes    No anethesia notable event occurred.    Staff: CRNA               BP   113/76   Temp   97.2   Pulse  82   Resp 12   SpO2 99

## 2024-06-15 NOTE — ASSESSMENT & PLAN NOTE
- Patient with chronic history of hypertension.  - Continue current medication regimen and resume home medication therapy as appropriate.  - Outpatient follow-up routine.
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
## 2024-06-15 NOTE — ANESTHESIA PREPROCEDURE EVALUATION
Procedure:  DEBRIDEMENT HAND/FINGER (WASH OUT) (Left: Hand)  OPEN REDUCTION W/ INTERNAL FIXATION (ORIF) HAND (Left: Hand)  DEBRIDEMENT WOUND (WASH OUT) (Left: Hand)    Relevant Problems   ANESTHESIA (within normal limits)      CARDIO   (+) HTN (hypertension)   (-) Angina at rest   (-) Angina of effort   (-) Chest pain   (-) TREVINO (dyspnea on exertion)      ENDO   (-) Diabetes mellitus type 1 (HCC)   (-) Diabetes mellitus, type 2 (HCC)      GI/HEPATIC   (-) Chronic liver disease      /RENAL   (-) Chronic kidney disease      NEURO/PSYCH   (+) Depression   (-) CVA (cerebral vascular accident) (HCC)   (-) Seizures (HCC)      PULMONARY   (-) Asthma   (-) Chronic obstructive pulmonary disease (HCC)   (-) Sleep apnea        Physical Exam    Airway    Mallampati score: II  TM Distance: >3 FB  Neck ROM: full     Dental   No notable dental hx     Cardiovascular      Pulmonary      Other Findings        Anesthesia Plan  ASA Score- 2     Anesthesia Type- general with ASA Monitors.         Additional Monitors:     Airway Plan: LMA.           Plan Factors-Exercise tolerance (METS): >4 METS.    Chart reviewed.                      Induction- intravenous.    Postoperative Plan- Plan for postoperative opioid use. Planned trial extubation    Perioperative Resuscitation Plan - Level 1 - Full Code.       Informed Consent- Anesthetic plan and risks discussed with patient.  I personally reviewed this patient with the CRNA. Discussed and agreed on the Anesthesia Plan with the CRNA..

## 2024-06-15 NOTE — PROGRESS NOTES
Orthopedics   Misha Gonzalez 63 y.o. male MRN: [id number]  Unit/Bed#: PACU 13      Subjective:  63 y.o.male post operative day 0 left hand I&D, open reduction, and percutaneous pinning.  On exam patient is resting comfortably in bed, pain is well-controlled.  He reports he is doing well overall.    Labs:  0   Lab Value Date/Time    HCT 30.2 (L) 06/15/2024 0411    HCT 31.2 (L) 06/15/2024 0031    HGB 10.5 (L) 06/15/2024 0411    HGB 11.0 (L) 06/15/2024 0031    INR 1.03 06/15/2024 0031    WBC 8.55 06/15/2024 0411    WBC 9.33 06/15/2024 0031       Meds:    Current Facility-Administered Medications:     [Transfer Hold] acetaminophen (TYLENOL) tablet 650 mg, 650 mg, Oral, Q4H PRN, Angelo Stratton MD    [Transfer Hold] ceFAZolin (ANCEF) IVPB (premix in dextrose) 2,000 mg 50 mL, 2,000 mg, Intravenous, Q8H, Shubham Salazar MD, Last Rate: 100 mL/hr at 06/15/24 0933, 2,000 mg at 06/15/24 0933    fentaNYL (SUBLIMAZE) injection 50 mcg, 50 mcg, Intravenous, Q5 Min PRN, Anmol Alfaro MD    HYDROmorphone (DILAUDID) injection 0.5 mg, 0.5 mg, Intravenous, Q5 Min PRN, Anmol Alfaro MD    [Transfer Hold] HYDROmorphone HCl (DILAUDID) injection 0.2 mg, 0.2 mg, Intravenous, Q4H PRN, Angelo Stratton MD, 0.2 mg at 06/15/24 0413    [Transfer Hold] naloxone (NARCAN) 0.04 mg/mL syringe 0.04 mg, 0.04 mg, Intravenous, Q1MIN PRN, Angelo Stratton MD    [Transfer Hold] ondansetron (ZOFRAN) injection 4 mg, 4 mg, Intravenous, Q4H PRN, Angelo Stratton MD    ondansetron (ZOFRAN) injection 4 mg, 4 mg, Intravenous, Once PRN, Anmol Alfaro MD    [Transfer Hold] oxyCODONE (ROXICODONE) split tablet 2.5 mg, 2.5 mg, Oral, Q4H PRN **OR** [Transfer Hold] oxyCODONE (ROXICODONE) IR tablet 5 mg, 5 mg, Oral, Q4H PRN, Angelo P MD Viral, 5 mg at 06/15/24 0731    promethazine (PHENERGAN) injection 25 mg, 25 mg, Intravenous, Once PRN, Anmol Alfaro MD    [Transfer Hold] traZODone (DESYREL) tablet 50 mg, 50 mg, Oral, HS PRN, Angelo P  "MD Viral, 50 mg at 06/15/24 0110    Blood Culture:   No results found for: \"BLOODCX\"    Wound Culture:   No results found for: \"WOUNDCULT\"    Ins and Outs:  I/O last 24 hours:  In: 800 [I.V.:700; IV Piggyback:100]  Out: 25 [Blood:25]          Physical Exam:  Vitals:    06/15/24 1309   BP: 113/76   Pulse: 76   Resp: 13   Temp: (!) 97.2 °F (36.2 °C)   SpO2: 99%     left Upper Extremity extremity:  Left upper extremity in volar slab splint, surgical dressings are clean, dry, intact without signs of strikethrough or saturation.  Mild tenderness  Sensation intact to exposed digits, but decreased consistent with intraoperative nerve block.  Patient able to move fingertips appropriately.  Exposed digits are warm and well-perfused with capillary refill less than 2 seconds.    Assessment: 63 y.o.male post operative day 0 left hand I&D and open reduction percutaneous pinning.  Doing well    Plan:  NWB L hand  May move fingers within confines of bandage  Analgesics  Ice  Will continue to assess for acute blood loss anemia  Please follow up in clinic with Dr. Campos in 7-10 days after surgery    Blane Peterson MD              "

## 2024-06-15 NOTE — DISCHARGE INSTR - AVS FIRST PAGE
Post Operative Instructions    You have had surgery on your arm today, please read and follow the information below:  Elevate your hand above your elbow during the next 24-48 hours to help with swelling.  Place your hand and arm over your head with motion at your shoulder three times a day.  Do not apply any cream/ointment/oil to your incisions including antibiotics.  Do not soak your hands in standing water (dishwater, tubs, Jacuzzi's, pools, etc.) until given permission (typically 2-3 weeks after injury)    Call the office if you notice any:  Increased numbness or tingling of your hand or fingers that is not relieved with elevation.  Increasing pain that is not controlled with medication.  Difficulty chewing, breathing, swallowing.  Chest pains or shortness of breath.  Fever over 101.4 degrees.    Bandage: Do NOT remove bandage until follow-up appointment.    Motion: you may move your fingers within the confines of the bandage    Weight bearing status: The operated extremity should be non-weight bearing until further notice.    Ice: Ice for 10 minutes every hour as needed for swelling x 24 hours.    Sling: Please use your sling as needed for comfort. While using the sling, make sure to move your shoulder throughout the day to prevent stiffness here.     Medications:   Tylenol and Toradol- You may take over-the-counter Tylenol and Toradol.  Tylenol dosing can be either Extra Strength (500 mg) or 2 tablets of Regular Strength (650 mg). You may take toradol for 3 days after which you may continue with Ibuprofen instead. Ibuprofen dosing can be up to 3 tablets of 200 mg for a total of 600mg.  Timing is as follows:  Take Tylenol, then 3 hours later take ibuprofen, then 3 hours later take Tylenol, then 3 hours later take ibuprofen.  Every 3 hours, you will take either Tylenol or ibuprofen.  Antibiotics will be prescribed only if indicated.        Follow-up Appointment: 7-10 days. With Dr. Campos.      Please call the  office if you have any questions or concerns regarding your post-operative care.

## 2024-06-15 NOTE — ASSESSMENT & PLAN NOTE
- Patient with multiple open left hand dislocations, present on presentation.  - X-ray left hand demonstrated: Second through fifth CMC joint dislocations. No definite fracture though limited by overlapping bones.   - Follow-up x-ray shows possible fracture of base of fourth metacarpal.  - Appreciated Hand/Orthopedic surgery evaluation, recommendations and assistance with management.  -Status post reduction and splinting at outside hospital.  - N.p.o. 4 OR today.  - Monitor neurovascular exam.  - Continue multimodal analgesic regimen.  - Maintain splint and nonweightbearing status on the left upper extremity.  - Reevaluate postoperatively.  - Will need outpatient follow-up with hand surgery and likely outpatient therapy services.
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
## 2024-06-16 VITALS
RESPIRATION RATE: 18 BRPM | TEMPERATURE: 98.3 F | WEIGHT: 188 LBS | BODY MASS INDEX: 27.85 KG/M2 | DIASTOLIC BLOOD PRESSURE: 68 MMHG | HEIGHT: 69 IN | HEART RATE: 81 BPM | OXYGEN SATURATION: 97 % | SYSTOLIC BLOOD PRESSURE: 105 MMHG

## 2024-06-16 LAB
ANION GAP SERPL CALCULATED.3IONS-SCNC: 7 MMOL/L (ref 4–13)
BUN SERPL-MCNC: 11 MG/DL (ref 5–25)
CALCIUM SERPL-MCNC: 7.9 MG/DL (ref 8.4–10.2)
CHLORIDE SERPL-SCNC: 103 MMOL/L (ref 96–108)
CO2 SERPL-SCNC: 27 MMOL/L (ref 21–32)
CREAT SERPL-MCNC: 0.9 MG/DL (ref 0.6–1.3)
GFR SERPL CREATININE-BSD FRML MDRD: 90 ML/MIN/1.73SQ M
GLUCOSE SERPL-MCNC: 107 MG/DL (ref 65–140)
POTASSIUM SERPL-SCNC: 4.2 MMOL/L (ref 3.5–5.3)
SODIUM SERPL-SCNC: 137 MMOL/L (ref 135–147)

## 2024-06-16 PROCEDURE — NC001 PR NO CHARGE: Performed by: ORTHOPAEDIC SURGERY

## 2024-06-16 PROCEDURE — 97166 OT EVAL MOD COMPLEX 45 MIN: CPT

## 2024-06-16 PROCEDURE — 99238 HOSP IP/OBS DSCHRG MGMT 30/<: CPT | Performed by: PHYSICIAN ASSISTANT

## 2024-06-16 PROCEDURE — 80048 BASIC METABOLIC PNL TOTAL CA: CPT

## 2024-06-16 RX ORDER — ACETAMINOPHEN 325 MG/1
650 TABLET ORAL EVERY 4 HOURS PRN
Start: 2024-06-16

## 2024-06-16 RX ORDER — OXYCODONE HYDROCHLORIDE 5 MG/1
2.5-5 TABLET ORAL EVERY 4 HOURS PRN
Qty: 18 TABLET | Refills: 0 | Status: SHIPPED | OUTPATIENT
Start: 2024-06-16 | End: 2024-06-19

## 2024-06-16 RX ADMIN — CEFAZOLIN SODIUM 2000 MG: 2 SOLUTION INTRAVENOUS at 05:03

## 2024-06-16 RX ADMIN — ENOXAPARIN SODIUM 30 MG: 30 INJECTION SUBCUTANEOUS at 09:45

## 2024-06-16 NOTE — ASSESSMENT & PLAN NOTE
- Patient with multiple open left hand dislocations, present on presentation.  - X-ray left hand demonstrated: Second through fifth CMC joint dislocations. No definite fracture though limited by overlapping bones.   - Follow-up x-ray shows possible fracture of base of fourth metacarpal.  - Appreciated Hand/Orthopedic surgery evaluation, recommendations and assistance with management.  -Status post reduction and splinting at outside hospital.  - Status left hand wound debridement and washout with ORIF of multiple carpometacarpal dislocations on 6/15/2024.  - Monitor neurovascular exam.  - Continue multimodal analgesic regimen.  - Maintain splint and nonweightbearing status on the left upper extremity.  - Will need outpatient follow-up with hand surgery and likely outpatient therapy services.
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
## 2024-06-16 NOTE — PROGRESS NOTES
Progress Note - Orthopedics   Misha Gonzalez 63 y.o. male MRN: [id number]  Unit/Bed#: [facility] 622-01      Subjective:    63 y.o.male POD 1 L hand I&D, open reduction and percutaneous pinning. No acute events, no new complaints. Denies fevers, chills, CP, SOB, N/V, numbness. Pain well controlled. Patient states he's doing well overall and only notes some minor tingling to the long finger distal phalanx improved relative to yesterday after receiving the nerve block.    Labs:  0   Lab Value Date/Time    HCT 30.2 (L) 06/15/2024 0411    HCT 31.2 (L) 06/15/2024 0031    HGB 10.5 (L) 06/15/2024 0411    HGB 11.0 (L) 06/15/2024 0031    INR 1.03 06/15/2024 0031    WBC 8.55 06/15/2024 0411    WBC 9.33 06/15/2024 0031       Meds:    Current Facility-Administered Medications:     acetaminophen (TYLENOL) tablet 650 mg, 650 mg, Oral, Q4H PRN, Blane Peterson MD    ceFAZolin (ANCEF) IVPB (premix in dextrose) 2,000 mg 50 mL, 2,000 mg, Intravenous, Q8H, Blane Peterson MD, Last Rate: 100 mL/hr at 06/16/24 0503, 2,000 mg at 06/16/24 0503    enoxaparin (LOVENOX) subcutaneous injection 30 mg, 30 mg, Subcutaneous, Q12H [facility], 30 mg at 06/15/24 2032    HYDROmorphone HCl (DILAUDID) injection 0.2 mg, 0.2 mg, Intravenous, Q4H PRN, Blane Peterson MD, 0.2 mg at 06/15/24 0413    lactated ringers bolus 1,000 mL, 1,000 mL, Intravenous, Once PRN, Last Rate: 1,000 mL/hr at 06/15/24 2232, 1,000 mL at 06/15/24 2232 **AND** lactated ringers bolus 1,000 mL, 1,000 mL, Intravenous, Once PRN, Blane Peterson MD    naloxone (NARCAN) 0.04 mg/mL syringe 0.04 mg, 0.04 mg, Intravenous, Q1MIN PRN, Blane Peterson MD    ondansetron (ZOFRAN) injection 4 mg, 4 mg, Intravenous, Q4H PRN, Blane Peterson MD    oxyCODONE (ROXICODONE) split tablet 2.5 mg, 2.5 mg, Oral, Q4H PRN **OR** oxyCODONE (ROXICODONE) IR tablet 5 mg, 5 mg, Oral, Q4H PRN, Blane Peterson MD, 5 mg at 06/15/24 0731    sodium chloride 0.9 % bolus 1,000 mL, 1,000 mL, Intravenous, Once PRN **AND** sodium chloride 0.9  "% bolus 1,000 mL, 1,000 mL, Intravenous, Once PRN, Blane Peterson MD    traZODone (DESYREL) tablet 50 mg, 50 mg, Oral, HS PRN, Blane Peterson MD, 50 mg at 06/15/24 2031    Blood Culture:   No results found for: \"BLOODCX\"    Wound Culture:   No results found for: \"WOUNDCULT\"    Ins and Outs:  I/O last 24 hours:  In: 800 [I.V.:700; IV Piggyback:100]  Out: 25 [Blood:25]          Physical:  Vitals:    06/16/24 0302   BP: 102/63   Pulse: 74   Resp:    Temp:    SpO2: 94%       Musculoskeletal: left Upper Extremity  Left upper extremity in volar slab splint, surgical dressings are clean, dry, intact without signs of strikethrough or saturation.  Mild tenderness  Sensation intact to exposed digits.  Patient able to move fingertips appropriately.  Exposed digits are warm and well-perfused with capillary refill less than 2 seconds.      Assessment:    63 y.o.male post operative day 1 left hand I&D and open reduction percutaneous pinning.  Doing well .     Plan:  NWB L hand  May move fingers within confines of bandage  Analgesics  Ice  Will continue to assess for acute blood loss anemia  Please follow up in clinic with Dr. Campos in 7-10 days after surgery    Blane Peterson MD    "

## 2024-06-16 NOTE — PLAN OF CARE
Problem: PAIN - ADULT  Goal: Verbalizes/displays adequate comfort level or baseline comfort level  Description: Interventions:  - Encourage patient to monitor pain and request assistance  - Assess pain using appropriate pain scale  - Administer analgesics based on type and severity of pain and evaluate response  - Implement non-pharmacological measures as appropriate and evaluate response  - Consider cultural and social influences on pain and pain management  - Notify physician/advanced practitioner if interventions unsuccessful or patient reports new pain  Outcome: Adequate for Discharge     Problem: INFECTION - ADULT  Goal: Absence or prevention of progression during hospitalization  Description: INTERVENTIONS:  - Assess and monitor for signs and symptoms of infection  - Monitor lab/diagnostic results  - Monitor all insertion sites, i.e. indwelling lines, tubes, and drains  - Monitor endotracheal if appropriate and nasal secretions for changes in amount and color  - [place] appropriate cooling/warming therapies per order  - Administer medications as ordered  - Instruct and encourage patient and family to use good hand hygiene technique  - Identify and instruct in appropriate isolation precautions for identified infection/condition  Outcome: Adequate for Discharge  Goal: Absence of fever/infection during neutropenic period  Description: INTERVENTIONS:  - Monitor WBC    Outcome: Adequate for Discharge     Problem: SAFETY ADULT  Goal: Patient will remain free of falls  Description: INTERVENTIONS:  - Educate patient/family on patient safety including physical limitations  - Instruct patient to call for assistance with activity   - Consult OT/PT to assist with strengthening/mobility   - Keep Call bell within reach  - Keep bed low and locked with side rails adjusted as appropriate  - Keep care items and personal belongings within reach  - Initiate and maintain comfort rounds  - Make Fall Risk Sign visible to  staff  - Offer Toileting every 2 Hours, in advance of need  - Initiate/Maintain bed/chair alarm  - Obtain necessary fall risk management equipment:   - Apply yellow socks and bracelet for high fall risk patients  - Consider moving patient to room near nurses station  Outcome: Adequate for Discharge  Goal: Maintain or return to baseline ADL function  Description: INTERVENTIONS:  -  Assess patient's ability to carry out ADLs; assess patient's baseline for ADL function and identify physical deficits which impact ability to perform ADLs (bathing, care of mouth/teeth, toileting, grooming, dressing, etc.)  - Assess/evaluate cause of self-care deficits   - Assess range of motion  - Assess patient's mobility; develop plan if impaired  - Assess patient's need for assistive devices and provide as appropriate  - Encourage maximum independence but intervene and supervise when necessary  - Involve family in performance of ADLs  - Assess for home care needs following discharge   - Consider OT consult to assist with ADL evaluation and planning for discharge  - Provide patient education as appropriate  Outcome: Adequate for Discharge  Goal: Maintains/Returns to pre admission functional level  Description: INTERVENTIONS:  - Perform AM-PAC 6 Click Basic Mobility/ Daily Activity assessment daily.  - Set and communicate daily mobility goal to care team and patient/family/caregiver.   - Collaborate with rehabilitation services on mobility goals if consulted  - Perform Range of Motion 3 times a day.  - Reposition patient every 2 hours.  - Dangle patient 3 times a day  - Stand patient 3 times a day  - Ambulate patient 3 times a day  - Out of bed to chair 3 times a day   - Out of bed for meals 3 times a day  - Out of bed for toileting  - Record patient progress and toleration of activity level   Outcome: Adequate for Discharge     Problem: DISCHARGE PLANNING  Goal: Discharge to home or other facility with appropriate  resources  Description: INTERVENTIONS:  - Identify barriers to discharge w/patient and caregiver  - Arrange for needed discharge resources and transportation as appropriate  - Identify discharge learning needs (meds, wound care, etc.)  - Arrange for interpretive services to assist at discharge as needed  - Refer to Case Management Department for coordinating discharge planning if the patient needs post-hospital services based on physician/advanced practitioner order or complex needs related to functional status, cognitive ability, or social support system  Outcome: Adequate for Discharge     Problem: Knowledge Deficit  Goal: Patient/family/caregiver demonstrates understanding of disease process, treatment plan, medications, and discharge instructions  Description: Complete learning assessment and assess knowledge base.  Interventions:  - Provide teaching at level of understanding  - Provide teaching via preferred learning methods  Outcome: Adequate for Discharge     Problem: SKIN/TISSUE INTEGRITY - ADULT  Goal: Skin Integrity remains intact(Skin Breakdown Prevention)  Description: Assess:  -Perform Chris assessment every shift  -Clean and moisturize skin every shift  -Inspect skin when repositioning, toileting, and assisting with ADLS  -Assess under medical devices such as lea every shift  -Assess extremities for adequate circulation and sensation     Bed Management:  -Have minimal linens on bed & keep smooth, unwrinkled  -Change linens as needed when moist or perspiring  -Avoid sitting or lying in one position for more than 2 hours while in bed  -Keep HOB at 45degrees     Toileting:  -Offer bedside commode  -Assess for incontinence every shift  -Use incontinent care products after each incontinent episode such as     Activity:  -Mobilize patient 3 times a day  -Encourage activity and walks on unit  -Encourage or provide ROM exercises   -Turn and reposition patient every 3 Hours  -Use appropriate equipment to  lift or move patient in bed  -Instruct/ Assist with weight shifting every 3 when out of bed in chair  -Consider limitation of chair time 3 hour intervals    Skin Care:  -Avoid use of baby powder, tape, friction and shearing, hot water or constrictive clothing  -Relieve pressure over bony prominences using alevin  -Do not massage red bony areas    Next Steps:  -Teach patient strategies to minimize risks such as    -Consider consults to  interdisciplinary teams such as   Outcome: Adequate for Discharge  Goal: Incision(s), wounds(s) or drain site(s) healing without S/S of infection  Description: INTERVENTIONS  - Assess and document dressing, incision, wound bed, drain sites and surrounding tissue  - Provide patient and family education  - Perform skin care/dressing changes every shift  Outcome: Adequate for Discharge  Goal: Pressure injury heals and does not worsen  Description: Interventions:  - Implement low air loss mattress or specialty surface (Criteria met)  - Apply silicone foam dressing  - Instruct/assist with weight shifting every 20 minutes when in chair   - Limit chair time to 2 hour intervals  - Use special pressure reducing interventions such as  when in chair   - Apply fecal or urinary incontinence containment device   - Perform passive or active ROM every shift  - Turn and reposition patient & offload bony prominences every 2 hours   - Utilize friction reducing device or surface for transfers   - Consider consults to  interdisciplinary teams such as   - Use incontinent care products after each incontinent episode such as   - Consider nutrition services referral as needed  Outcome: Adequate for Discharge     Problem: MUSCULOSKELETAL - ADULT  Goal: Maintain or return mobility to safest level of function  Description: INTERVENTIONS:  - Assess patient's ability to carry out ADLs; assess patient's baseline for ADL function and identify physical deficits which impact ability to perform ADLs (bathing, care of  mouth/teeth, toileting, grooming, dressing, etc.)  - Assess/evaluate cause of self-care deficits   - Assess range of motion  - Assess patient's mobility  - Assess patient's need for assistive devices and provide as appropriate  - Encourage maximum independence but intervene and supervise when necessary  - Involve family in performance of ADLs  - Assess for home care needs following discharge   - Consider OT consult to assist with ADL evaluation and planning for discharge  - Provide patient education as appropriate  Outcome: Adequate for Discharge  Goal: Maintain proper alignment of affected body part  Description: INTERVENTIONS:  - Support, maintain and protect limb and body alignment  - Provide patient/ family with appropriate education  Outcome: Adequate for Discharge

## 2024-06-16 NOTE — QUICK NOTE
"Acute Care Surgery   Post-Op Check Progress Note   Misha Gonzalez 63 y.o. male MRN: [id number]  Unit/Bed#: [facility] [place] 622-01 Encounter: [id number]    Assessment and Plan:    63-year-old male with multiple left hand open carpometacarpal joint dislocations status post left hand wound debridement, washout and ORIF on 6/15/2024.   - Diet as tolerated.   - Maintain nonweightbearing status on the left upper extremity with splint in place.   - Continue multimodal analgesic regimen.   - Outpatient therapy evaluation and treatment as indicated.   - Encouraged incentive spirometry use.      Subjective/Objective     Subjective: Patient reports that he feels pretty good postoperatively.  His pain is well-controlled at this time.  He is tolerating postoperative oral intake without nausea or vomiting.    Objective:     Blood pressure 102/63, pulse 74, temperature 97.9 °F (36.6 °C), resp. rate 18, height 5' 9\" (1.753 m), weight 85.3 kg (188 lb), SpO2 94%.,Body mass index is 27.76 kg/m².      Intake/Output Summary (Last 24 hours) at 6/16/2024 0616  Last data filed at 6/15/2024 1303  Gross per 24 hour   Intake 800 ml   Output 25 ml   Net 775 ml       Invasive Devices       Peripheral Intravenous Line  Duration             Peripheral IV 06/15/24 Right Forearm 1 day                    Physical Exam:    GENERAL APPEARANCE: Patient in no acute distress.  HEENT: NCAT; EOMs intact; Mucous membranes moist  CV: Regular rate and rhythm; + S1, S2; no murmur/gallops/rubs appreciated.  LUNGS: Clear to auscultation; no wheezes/rales/rhonci.  ABD: NABS; soft; non-distended; non-tender.  EXT: +2 pulses bilaterally upper & lower extremities; no clubbing/cyanosis.  Moderate swelling in the exposed distal left hand and fingers with mild left hand tenderness and a clean/dry/intact postoperative dressing overlying splint.  NEURO: GCS 15; no focal neurologic deficits; neurovascularly intact.  SKIN: Warm, dry and well perfused; no rash; no jaundice.  Ecchymosis of " the distal exposed hand and fingers.                Mark Roper PA-C  6/16/2024
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
## 2024-06-17 ENCOUNTER — OFFICE VISIT (OUTPATIENT)
Dept: OBGYN CLINIC | Facility: CLINIC | Age: 64
End: 2024-06-17
Payer: COMMERCIAL

## 2024-06-17 ENCOUNTER — NURSE TRIAGE (OUTPATIENT)
Age: 64
End: 2024-06-17

## 2024-06-17 ENCOUNTER — TELEPHONE (OUTPATIENT)
Age: 64
End: 2024-06-17

## 2024-06-17 VITALS — WEIGHT: 188 LBS | BODY MASS INDEX: 27.85 KG/M2 | HEIGHT: 69 IN

## 2024-06-17 DIAGNOSIS — Z98.890 POSTOPERATIVE STATE: Primary | ICD-10-CM

## 2024-06-17 PROCEDURE — 29125 APPL SHORT ARM SPLINT STATIC: CPT | Performed by: STUDENT IN AN ORGANIZED HEALTH CARE EDUCATION/TRAINING PROGRAM

## 2024-06-17 PROCEDURE — 99024 POSTOP FOLLOW-UP VISIT: CPT | Performed by: STUDENT IN AN ORGANIZED HEALTH CARE EDUCATION/TRAINING PROGRAM
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
## 2024-06-17 NOTE — TELEPHONE ENCOUNTER
Caller: Misha    Doctor: Beth    Reason for call: Patient's bandage is bleeding through.  And he said it looks like it is still bleeding a bit. Patient had SX on Saturday The Oxycodone was never sent to the pharmacy.  Please advise patient    Call back#: [phone]

## 2024-06-17 NOTE — PROGRESS NOTES
Assessment/Plan:  Patient ID: 63 y.o. male   Surgery: Debridement Hand/finger (wash Out) - Left, Open Reduction W/ Internal Fixation (orif) Hand - Left, and Debridement Wound (wash Out) - Left performed by Dr. Campos.  Date of Surgery: 6/15/2024    The patient is doing well postoperatively. Discussed with the patient that the bleeding was likely due to swelling from the proximal pin site. There is no drainage on exam today. A new volar slab splint was applied in the office today. Clinical images were taken and uploaded into the patient's chart.    Follow Up:  Next week with Dr. Campos    To Do Next Visit:        CHIEF COMPLAINT:  Chief Complaint   Patient presents with   • Left Hand - Pain         SUBJECTIVE:  Patient is a 63 y.o. year old male who presents for follow up after Debridement Hand/finger (wash Out) - Left, Open Reduction W/ Internal Fixation (orif) Hand - Left, and Debridement Wound (wash Out) - Left performed by Dr. Campos. Today patient states when he woke up this morning he noticed bleeding through his bandage.       PHYSICAL EXAMINATION:  General: Well developed and well nourished, alert and oriented x 3, appears comfortable  Psychiatric: Normal    MUSCULOSKELETAL EXAMINATION:  Incision: Clean, dry, intact no drainage. Tiny amount of serous fluid from proximal pin site  Surgery Site: normal, no evidence of infection   Range of Motion: As expected  Neurovascular status: Neuro intact, good cap refill         STUDIES REVIEWED:  No new studies to review       PROCEDURES PERFORMED:  Splint application    Date/Time: 6/17/2024 4:45 PM    Performed by: Ye Garrett MD  Authorized by: Ye Garrett MD  Universal Protocol:  Consent: Verbal consent obtained.  Consent given by: patient  Patient identity confirmed: verbally with patient    Procedure details:     Laterality:  Left    Splint type:  Volar short arm    Supplies:  Cotton padding, elastic bandage and Ortho-Glass  Post-procedure details:      Patient tolerance of procedure:  Tolerated well, no immediate complications         Scribe Attestation    I,:  Shirley Gabriel MA am acting as a scribe while in the presence of the attending physician.:       I,:  Ye Garrett MD personally performed the services described in this documentation    as scribed in my presence.:

## 2024-06-17 NOTE — TELEPHONE ENCOUNTER
"Patient calling regarding surgery.  Patient referred to Orthopedics who did surgery.    Answer Assessment - Initial Assessment Questions  1. REASON FOR CALL or QUESTION: \"What is your reason for calling today?\" or \"How can I best help you?\" or \"What question do you have that I can help answer?\"      Patient called wrong number.    Protocols used: Information Only Call - No Triage-ADULT-OH    "

## 2024-06-18 NOTE — ED PROCEDURE NOTE
"PROCEDURE  Orthopedic injury treatment    Date/Time: 6/14/2024 6:00 PM    Performed by: Valentino Sierra DO  Authorized by: Valentino Sierra DO    Patient Location:  ED  [place] Protocol:  Procedure performed by:  Consent: The procedure was performed in an emergent situation. Verbal consent obtained. Written consent not obtained.  Risks and benefits: risks, benefits and alternatives were discussed  Consent given by: patient  Time out: Immediately prior to procedure a \"time out\" was called to verify the correct patient, procedure, equipment, support staff and site/side marked as required.  Patient understanding: patient states understanding of the procedure being performed  Patient consent: the patient's understanding of the procedure matches consent given  Procedure consent: procedure consent matches procedure scheduled  Relevant documents: relevant documents present and verified  Test results: test results available and properly labeled  Site marked: the operative site was marked  Radiology Images displayed and confirmed. If images not available, report reviewed: imaging studies available  Patient identity confirmed: verbally with patient and arm band    Injury location:  Hand  Location details:  Left hand  Injury type:  Dislocation  Dislocation type: carpometacarpal (finger)    Dislocation type: carpometacarpal (finger)    Distal perfusion: normal    Neurological function: normal    Range of motion: reduced    Local anesthesia used?: Yes    General anesthesia used?: No    Anesthesia:  Local infiltration  Local anesthetic:  Lidocaine 1% without epinephrine  Manipulation performed?: Yes    Skeletal traction used?: No    Reduction successful?: No    Confirmation: Reduction confirmed by x-ray    Immobilization:  Splint and ace wrap  Supplies used:  Elastic bandage  Distal perfusion: normal    Neurological function: normal    Range of motion: normal    Patient tolerance:  Patient tolerated the procedure well with no " immediate complications   Unable to reduce carpal/metacarpal 2-4.        Valentino Sierra, DO  06/18/24 0454

## 2024-06-18 NOTE — UTILIZATION REVIEW
NOTIFICATION OF ADMISSION DISCHARGE   This is a Notification of Discharge from [facility]. Please be advised that this patient has been discharge from our facility. Below you will find the admission and discharge date and time including the patient’s disposition.   UTILIZATION REVIEW CONTACT:  Suleiman Jordan  Utilization   Network Utilization Review Department  Phone: [phone] x carefully listen to the prompts. All voicemails are confidential.  Email: [email or web address]     ADMISSION INFORMATION  PRESENTATION DATE: 6/14/2024  7:38 PM  OBERVATION ADMISSION DATE:   INPATIENT ADMISSION DATE: 6/14/24  8:59 PM   DISCHARGE DATE: 6/16/2024 11:08 AM   DISPOSITION:Home/Self Care    Network Utilization Review Department  ATTENTION: Please call with any questions or concerns to [phone] and carefully listen to the prompts so that you are directed to the right person. All voicemails are confidential.   For Discharge needs, contact Care Management DC Support Team at [phone] opt. 2  Send all requests for admission clinical reviews, approved or denied determinations and any other requests to dedicated fax number below belonging to the campus where the patient is receiving treatment. List of dedicated fax numbers for the Facilities:  FACILITY NAME UR FAX NUMBER   ADMISSION DENIALS (Administrative/Medical Necessity) [phone]   DISCHARGE SUPPORT TEAM ([facility]) [phone]   PARENT CHILD HEALTH (Maternity/NICU/Pediatrics) [phone]   [facility] [phone]   [facility] [phone]   [facility] [phone]   [facility] [phone]   Critical access hospital [phone]   [facility] [phone]   [facility] [phone]   [facility] [phone]   [facility]  [facility] [phone]   [facility] [phone]   [facility] [phone]   [facility] [phone]

## 2024-06-25 ENCOUNTER — OFFICE VISIT (OUTPATIENT)
Dept: OCCUPATIONAL THERAPY | Facility: CLINIC | Age: 64
End: 2024-06-25
Payer: COMMERCIAL

## 2024-06-25 ENCOUNTER — OFFICE VISIT (OUTPATIENT)
Dept: OBGYN CLINIC | Facility: CLINIC | Age: 64
End: 2024-06-25

## 2024-06-25 VITALS
BODY MASS INDEX: 27.85 KG/M2 | DIASTOLIC BLOOD PRESSURE: 80 MMHG | SYSTOLIC BLOOD PRESSURE: 118 MMHG | HEIGHT: 69 IN | WEIGHT: 188 LBS

## 2024-06-25 DIAGNOSIS — S63.055A CMC (CARPOMETACARPAL JOINT) DISLOCATION, LEFT, INITIAL ENCOUNTER: Primary | ICD-10-CM

## 2024-06-25 DIAGNOSIS — Z98.890 POSTOPERATIVE STATE: ICD-10-CM

## 2024-06-25 DIAGNOSIS — Z98.890 POSTOPERATIVE STATE: Primary | ICD-10-CM

## 2024-06-25 PROCEDURE — 97760 ORTHOTIC MGMT&TRAING 1ST ENC: CPT | Performed by: OCCUPATIONAL THERAPIST

## 2024-06-25 PROCEDURE — 99024 POSTOP FOLLOW-UP VISIT: CPT | Performed by: ORTHOPAEDIC SURGERY

## 2024-06-25 NOTE — PATIENT INSTRUCTIONS
Instructions    Elevate your hand above your elbow  to help with swelling.  Place your hand and arm over your head with motion at your shoulder three times a day.  Do not apply any cream/ointment/oil to your incisions including antibiotics.  Do not soak your hands in standing water (dishwater, tubs, Jacuzzi's, pools, etc.) until given permission (typically 2-3 weeks after injury)    Call the office if you notice any:  Increased numbness or tingling of your hand or fingers that is not relieved with elevation.  Increasing pain that is not controlled with medication.  Difficulty chewing, breathing, swallowing.  Chest pains or shortness of breath.  Fever over 101.4 degrees.      Motion: The therapist will give you instructions for motion of your fingers and wrist.  Wear the splint as instructed by the therapist.  Follow pin care as instructed by the therapist.    Weight bearing status: The operated extremity should be non-weight bearing until further notice.      Medications:   Tylenol and Ibuprofen- You may take over-the-counter Tylenol and Ibuprofen.  Tylenol dosing can be either Extra Strength (500 mg) or 2 tablets of Regular Strength (650 mg). Ibuprofen dosing can be up to 3 tablets of 200 mg for a total of 600mg.  Timing is as follows:  Take Tylenol, then 3 hours later take ibuprofen, then 3 hours later take Tylenol, then 3 hours later take ibuprofen.  Every 3 hours, you will take either Tylenol or ibuprofen.         Follow-up Appointment: 4 weeks with Dr. Campos      Please call the office if you have any questions or concerns regarding your post-operative care.

## 2024-06-25 NOTE — PROGRESS NOTES
"Assessment:   S/P Debridement Hand/finger (wash Out) - Left, Open Reduction W/ Internal Fixation (orif) Hand - Left, and Debridement Wound (wash Out) - Left on 6/15/2024    Plan:   Therapy for resting hand splint, pin care, and wound care.  Home exercise program will be given to the patient to work on wrist range of motion and IP joint range of motion.  Edema management.    For pain management, the patient was given the instructions for use of Tylenol and ibuprofen.  Those were printed in the after visit summary.    Follow Up:  4  week(s)    To Do Next Visit:    and X-rays of the  left  hand      CHIEF COMPLAINT:  Chief Complaint   Patient presents with    Left Hand - Follow-up, Pain     Constant pain, trouble sleeping         SUBJECTIVE:  Misha Gonzalez is a 63 y.o. male who presents for follow up after Debridement Hand/finger (wash Out) - Left, Open Reduction W/ Internal Fixation (orif) Hand - Left, and Debridement Wound (wash Out) - Left on 6/15/2024.  Today patient has Pain  Moderate  Intermittant  Aching.       PHYSICAL EXAMINATION:  Vital signs: /80 (BP Location: Right arm, Patient Position: Sitting, Cuff Size: Adult)   Ht 5' 9\" (1.753 m)   Wt 85.3 kg (188 lb)   BMI 27.76 kg/m²   General: well developed and well nourished, alert, oriented times 3, and appears comfortable  Psychiatric: Normal    MUSCULOSKELETAL EXAMINATION:  Incision:  healed wounds on palm of hand.  Dorsal surgical wound healed.  Range of Motion: As expected, Limited due to pain, and Limited due to stiffness  Neurovascular status: Neuro intact, good cap refill  Activity Restrictions: No use of left hand other than activities/home exercise program given to patient by hand therapist    Sutures removed from the dorsal wound today.  Sutures will remain intact for the wounds on the palm of the hand.         STUDIES REVIEWED:  No Studies to review      PROCEDURES PERFORMED:  Procedures  No Procedures performed today   "

## 2024-06-25 NOTE — PROGRESS NOTES
Orthosis    Diagnosis:   1. CMC (carpometacarpal joint) dislocation, left, initial encounter        2. Postoperative state  Ambulatory Referral to Occupational Therapy        Indication: Motion Blocking    Location: Left  wrist, hand, index finger, long finger, ring finger, and small finger  Supplies: Custom Fit Orthotic, Skin coverage , and Dressing   Orthosis type:  resting hand FB with digits   Wearing Schedule: Remove with Protected Technique Only as Needed  Describe Position: resting     Precautions: Universal (skin contact/breakdown)    Patient or Caregiver expresses understanding of wearing Schedule and Precautions? Yes  Patient or Caregiver able to don/doff orthotic independently?Yes    Written orders provided to patient? Yes  Orders Obtained: Verbal/written  Orders Obtained from: Dr Campos     Return for evaluation and treatment yes

## 2024-07-01 ENCOUNTER — EVALUATION (OUTPATIENT)
Dept: OCCUPATIONAL THERAPY | Facility: CLINIC | Age: 64
End: 2024-07-01
Payer: COMMERCIAL

## 2024-07-01 DIAGNOSIS — S63.055A CMC (CARPOMETACARPAL JOINT) DISLOCATION, LEFT, INITIAL ENCOUNTER: Primary | ICD-10-CM

## 2024-07-01 DIAGNOSIS — Z98.890 POST-OPERATIVE STATE: ICD-10-CM

## 2024-07-01 PROCEDURE — 97166 OT EVAL MOD COMPLEX 45 MIN: CPT
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
## 2024-07-03 ENCOUNTER — OFFICE VISIT (OUTPATIENT)
Dept: OCCUPATIONAL THERAPY | Facility: CLINIC | Age: 64
End: 2024-07-03
Payer: COMMERCIAL

## 2024-07-03 DIAGNOSIS — S63.055A CMC (CARPOMETACARPAL JOINT) DISLOCATION, LEFT, INITIAL ENCOUNTER: Primary | ICD-10-CM

## 2024-07-03 DIAGNOSIS — Z98.890 POST-OPERATIVE STATE: ICD-10-CM

## 2024-07-03 PROCEDURE — 97110 THERAPEUTIC EXERCISES: CPT

## 2024-07-03 PROCEDURE — 97140 MANUAL THERAPY 1/> REGIONS: CPT

## 2024-07-03 NOTE — PROGRESS NOTES
Daily Note     Today's date: 7/3/2024  Patient name: Misha Gonzalez  : 1960  MRN: [id number]  Referring provider: Ngoc Campos,*  Dx:   Encounter Diagnosis     ICD-10-CM    1. CMC (carpometacarpal joint) dislocation, left, initial encounter  S63.055A       2. Post-operative state  Z98.890           Start Time: 08  Stop Time: 09  Total time in clinic (min): 55 minutes    Subjective: Patient notes consistency with wound care, edema management and ROM.           Objective: See treatment diary below.    Incision is CDI. No signs of infection noted this date.     Left:  Thumb AROM:    Extension:  CMC: 12 degrees  MP: 8 degrees  IP: 0 degrees    Flexion:  CMC: 2 degrees  MP: 40 degrees  IP: 48 degrees      Radial abduction: 43 degrees  Palmar abduction: 45 degrees       Precautions:     Tubigrip Size F   S/P Debridement Hand/finger (wash Out) - Left, Open Reduction W/ Internal Fixation (orif) Hand - Left, and Debridement Wound (wash Out) - Left on 6/15/2024     Therapy for resting hand splint, pin care, and wound care.  Home exercise program will be given to the patient to work on wrist range of motion and IP joint range of motion.  Edema management.    *NWB L hand/wrist*    Fabrication of resting hand splint.  Thumb free.  Ips and be free.  Scar massage once sutures out.  Leave sutures in for one more week  Pin care instructions  Edema management     Modalities per therapist's discretion     ROM:  Elbow, forearm, wrist (radiocarpal joint) and fingers   __x__ Active  __x__ Active assist  __x__ Passive- fingers  ____ Resistive/Strengthening      Auth Tracker  Auth Status Total   Visits  Expiration date Co-Insurance   Pending Pending Pending                                Visit Tracker: AUTH REQ. BOMN. Copay: $25  Date   IE 7/3                                                                                     PMHx:   has a past medical history of Anxiety and Hypertension.         Manuals HEP 7/3/2024    STM/edema management  x15min                  Ther Ex     Education on HEP and dx  x5min   AROM hook fist x x10   AROM wrist flex/ext/RD/UD x x10   AROM forearm rotation x x10   Wound care management  x10min   Towel scrunches  x2                  Modalities     MHP  x5min          Assessment:   Patient tolerated session well. Session focused on HEP education, wound care management, range of motion, and patient education  to improve functional task performance with daily activities. Patient tolerated all MT and TE with good tolerance and moderate complaints of pain when performing digit ROM towel scrunches. Patient progressing well towards goals. Patient benefiting from skilled hand therapy OT to reduce deficits to improve independence with daily activities.        Plan:   Focus on AROM, AAROM, PROM of fingers, custom orthotic splint, train and fit orthotics, edema management, scar massage, pin care hygiene management, and all modalities as seen fit to improve ability to complete daily activites with ease.    POC 7/1/2024 - 8/12/2024

## 2024-07-08 ENCOUNTER — OFFICE VISIT (OUTPATIENT)
Dept: OCCUPATIONAL THERAPY | Facility: CLINIC | Age: 64
End: 2024-07-08
Payer: COMMERCIAL

## 2024-07-08 DIAGNOSIS — S63.055A CMC (CARPOMETACARPAL JOINT) DISLOCATION, LEFT, INITIAL ENCOUNTER: Primary | ICD-10-CM

## 2024-07-08 DIAGNOSIS — Z98.890 POSTOPERATIVE STATE: ICD-10-CM

## 2024-07-08 DIAGNOSIS — Z98.890 POST-OPERATIVE STATE: ICD-10-CM

## 2024-07-08 PROCEDURE — 97110 THERAPEUTIC EXERCISES: CPT

## 2024-07-08 PROCEDURE — 97140 MANUAL THERAPY 1/> REGIONS: CPT

## 2024-07-08 NOTE — PROGRESS NOTES
Daily Note     Today's date: 2024  Patient name: Misha Gonzalez  : 1960  MRN: [id number]  Referring provider: Ngoc Campos,*  Dx:   Encounter Diagnosis     ICD-10-CM    1. CMC (carpometacarpal joint) dislocation, left, initial encounter  S63.055A       2. Post-operative state  Z98.890       3. Postoperative state  Z98.890           Start Time: 1025  Stop Time: 1125  Total time in clinic (min): 60 minutes    Subjective: Patient notes consistency with wound care, edema management and ROM. Patient arrived to today's session without hand splint.           Objective: See treatment diary below.    Removal of sutures this date. Noted area on the palmar/volar side of the hand pale yellow cloudy serous drainage noted after debridement of incision after suture removal. Contacted Dr. Campos via Secure chat.            Precautions:     Tubigrip Size F   S/P Debridement Hand/finger (wash Out) - Left, Open Reduction W/ Internal Fixation (orif) Hand - Left, and Debridement Wound (wash Out) - Left on 6/15/2024     Therapy for resting hand splint, pin care, and wound care.  Home exercise program will be given to the patient to work on wrist range of motion and IP joint range of motion.  Edema management.    *NWB L hand/wrist*    Fabrication of resting hand splint.  Thumb free.  Ips and be free.  Scar massage once sutures out.  Leave sutures in for one more week  Pin care instructions  Edema management     Modalities per therapist's discretion     ROM:  Elbow, forearm, wrist (radiocarpal joint) and fingers   __x__ Active  __x__ Active assist  __x__ Passive- fingers  ____ Resistive/Strengthening      Auth Tracker  Auth Status Total   Visits  Expiration date Co-Insurance   Approved 24                                Visit Tracker: AUTH REQ. BOMN. Copay: $25  Date   IE 7/3 7/8            X                                                                        PMHx:   has a past medical history of Anxiety  and Hypertension.         Manuals HEP 7/8/2024   STM/edema management  x15min                  Ther Ex     Education on HEP and dx  x5min   Therapist assisted AAROM of wrist/hand and gentle PASSIVE fingers  x8min   AROM hook fist x x10   AROM wrist flex/ext/RD/UD x x10   AROM forearm rotation x x10   Wound care management/removal of sutures  x25min   Towel scrunches  x5                  Modalities     MHP  x5min          Assessment:   Patient tolerated session well. Session focused on HEP education, wound care management, range of motion, and patient education  to improve functional task performance with daily activities. Patient tolerated all MT and TE with good tolerance and minimal complaints of pain when performing digit ROM towel scrunches. Educated patient on the importance of wearing the splint for protection and only remove for wound care management, hygiene and therapy HEP. Patient verbalized understanding of this. Patient progressing well towards goals. Patient benefiting from skilled hand therapy OT to reduce deficits to improve independence with daily activities.        Plan:   Focus on AROM, AAROM, PROM of fingers, custom orthotic splint, train and fit orthotics, edema management, scar massage, pin care hygiene management, and all modalities as seen fit to improve ability to complete daily activites with ease.    POC 7/1/2024 - 8/12/2024

## 2024-07-10 ENCOUNTER — OFFICE VISIT (OUTPATIENT)
Dept: OCCUPATIONAL THERAPY | Facility: CLINIC | Age: 64
End: 2024-07-10
Payer: COMMERCIAL

## 2024-07-10 DIAGNOSIS — Z98.890 POSTOPERATIVE STATE: ICD-10-CM

## 2024-07-10 DIAGNOSIS — Z98.890 POST-OPERATIVE STATE: ICD-10-CM

## 2024-07-10 DIAGNOSIS — S63.055A CMC (CARPOMETACARPAL JOINT) DISLOCATION, LEFT, INITIAL ENCOUNTER: Primary | ICD-10-CM

## 2024-07-10 PROCEDURE — 97140 MANUAL THERAPY 1/> REGIONS: CPT

## 2024-07-10 PROCEDURE — 97110 THERAPEUTIC EXERCISES: CPT

## 2024-07-10 NOTE — PROGRESS NOTES
Daily Note     Today's date: 7/10/2024  Patient name: Misha Gonzalez  : 1960  MRN: [id number]  Referring provider: Ngoc Campos,*  Dx:   Encounter Diagnosis     ICD-10-CM    1. CMC (carpometacarpal joint) dislocation, left, initial encounter  S63.055A       2. Post-operative state  Z98.890       3. Postoperative state  Z98.890           Start Time: 0800  Stop Time: 0845  Total time in clinic (min): 45 minutes    Subjective: Patient reports increased pain on the dorsum side of the hand at the beginning of today's session. Patient arrived to today's session without hand splint.           Objective: See treatment diary below.             Precautions:     Tubigrip Size F   S/P Debridement Hand/finger (wash Out) - Left, Open Reduction W/ Internal Fixation (orif) Hand - Left, and Debridement Wound (wash Out) - Left on 6/15/2024     Therapy for resting hand splint, pin care, and wound care.  Home exercise program will be given to the patient to work on wrist range of motion and IP joint range of motion.  Edema management.    *NWB L hand/wrist*    Fabrication of resting hand splint.  Thumb free.  Ips and be free.  Scar massage once sutures out.  Leave sutures in for one more week  Pin care instructions  Edema management     Modalities per therapist's discretion     ROM:  Elbow, forearm, wrist (radiocarpal joint) and fingers   __x__ Active  __x__ Active assist  __x__ Passive- fingers  ____ Resistive/Strengthening      Auth Tracker  Auth Status Total   Visits  Expiration date Co-Insurance   Approved 24                                Visit Tracker: AUTH REQ. BOMN. Copay: $25  Date   IE 7/3 7/8 7/10           X                                                                        PMHx:   has a past medical history of Anxiety and Hypertension.         Manuals HEP 7/10/2024   STM/edema management  x10min                  Ther Ex     Education on HEP and dx  x5min   Therapist assisted AAROM of wrist/hand  and gentle PASSIVE fingers  x8min   AROM hook fist x x10   AROM wrist flex/ext/RD/UD x x10   AROM forearm rotation x x10   Wound care management  x5min   Towel scrunches  x10   Juxaciser  x5   Functional dexterity wooden pegs IHM  Whole container - 16 pegs   Wrist & forearm AROM w/ flexbar  x15   Gentle sponge squeezes x Yellow  x10-15        Modalities     MHP  x5min          Assessment:   Patient tolerated session well. Session focused on HEP education, wound care management, range of motion, and patient education  to improve functional task performance with daily activities. Patient tolerated all MT and TE with good tolerance and minimal complaints of pain when performing digit ROM towel scrunches. Educated patient on the importance of wearing the splint for protection and only remove for wound care management, hygiene and therapy HEP. Patient verbalized understanding of this. Patient progressing well towards goals. Patient benefiting from skilled hand therapy OT to reduce deficits to improve independence with daily activities.        Plan:   Focus on AROM, AAROM, PROM of fingers, custom orthotic splint, train and fit orthotics, edema management, scar massage, pin care hygiene management, and all modalities as seen fit to improve ability to complete daily activites with ease.    POC 7/1/2024 - 8/12/2024

## 2024-07-15 ENCOUNTER — OFFICE VISIT (OUTPATIENT)
Dept: OCCUPATIONAL THERAPY | Facility: CLINIC | Age: 64
End: 2024-07-15
Payer: COMMERCIAL

## 2024-07-15 DIAGNOSIS — S63.055A CMC (CARPOMETACARPAL JOINT) DISLOCATION, LEFT, INITIAL ENCOUNTER: Primary | ICD-10-CM

## 2024-07-15 DIAGNOSIS — Z98.890 POSTOPERATIVE STATE: ICD-10-CM

## 2024-07-15 DIAGNOSIS — Z98.890 POST-OPERATIVE STATE: ICD-10-CM

## 2024-07-15 PROCEDURE — 97140 MANUAL THERAPY 1/> REGIONS: CPT

## 2024-07-15 PROCEDURE — 97110 THERAPEUTIC EXERCISES: CPT

## 2024-07-15 NOTE — PROGRESS NOTES
Daily Note     Today's date: 7/15/2024  Patient name: Misha Gonzalez  : 1960  MRN: [id number]  Referring provider: Ngoc Campos,*  Dx:   Encounter Diagnosis     ICD-10-CM    1. CMC (carpometacarpal joint) dislocation, left, initial encounter  S63.055A       2. Post-operative state  Z98.890       3. Postoperative state  Z98.890           Start Time: 835  Stop Time: 935  Total time in clinic (min): 60 minutes    Subjective: Patient reports hitting the pin on the ulnar side of the hand on his dresser last night and the pin moved. Patient notes that he was not wearing the protective splint when this incident occurred. Patient arrived to today's session wearing the custom splint to today's session.           Objective: See treatment diary below.             Precautions:     Tubigrip Size F   S/P Debridement Hand/finger (wash Out) - Left, Open Reduction W/ Internal Fixation (orif) Hand - Left, and Debridement Wound (wash Out) - Left on 6/15/2024     Therapy for resting hand splint, pin care, and wound care.  Home exercise program will be given to the patient to work on wrist range of motion and IP joint range of motion.  Edema management.    *NWB L hand/wrist*    Fabrication of resting hand splint.  Thumb free.  Ips and be free.  Scar massage once sutures out.  Leave sutures in for one more week  Pin care instructions  Edema management     Modalities per therapist's discretion     ROM:  Elbow, forearm, wrist (radiocarpal joint) and fingers   __x__ Active  __x__ Active assist  __x__ Passive- fingers  ____ Resistive/Strengthening      Auth Tracker  Auth Status Total   Visits  Expiration date Co-Insurance   Approved 24                                Visit Tracker: AUTH REQ. BOMN. Copay: $25  Date   IE 7/3 7/8 7/10 7/15        RE  X                                                                        PMHx:   has a past medical history of Anxiety and Hypertension.         Manuals HEP 7/15/2024    STM/edema management  x10min                  Ther Ex     Education on HEP and dx  x5min   Therapist assisted AAROM of wrist/hand and gentle PASSIVE fingers  x10min   AROM hook fist x x10   AROM wrist flex/ext/RD/UD x x10   AROM forearm rotation x x10   Wound care management  x5min   Towel scrunches  x10   Juxaciser  x5   Functional dexterity wooden pegs IHM  Whole container - 16 pegs   Wrist & forearm AROM w/ flexbar  x15   Gentle sponge squeezes x Yellow  x10-15   Dice, Opposition  Whole container   Elbow ROM with cones  1 cycle - 10 cones        Modalities     MHP  x5min          Assessment:   Patient tolerated session well. Session focused on HEP education, wound care management, range of motion, and patient education  to improve functional task performance with daily activities. Patient tolerated all MT and TE with good tolerance and minimal complaints of pain requiring rest breaks as needed.  Educated patient on the importance of wearing the splint for protection and only remove for wound care management, hygiene and therapy HEP. Patient verbalized understanding of this. Patient progressing well towards goals. Patient benefiting from skilled hand therapy OT to reduce deficits to improve independence with daily activities.        Plan:   Focus on AROM, AAROM, PROM of fingers, custom orthotic splint, train and fit orthotics, edema management, scar massage, pin care hygiene management, and all modalities as seen fit to improve ability to complete daily activites with ease.    POC 7/1/2024 - 8/12/2024

## 2024-07-17 ENCOUNTER — APPOINTMENT (OUTPATIENT)
Dept: OCCUPATIONAL THERAPY | Facility: CLINIC | Age: 64
End: 2024-07-17
Payer: COMMERCIAL

## 2024-07-19 ENCOUNTER — OFFICE VISIT (OUTPATIENT)
Dept: OCCUPATIONAL THERAPY | Facility: CLINIC | Age: 64
End: 2024-07-19
Payer: COMMERCIAL

## 2024-07-19 DIAGNOSIS — Z98.890 POSTOPERATIVE STATE: ICD-10-CM

## 2024-07-19 DIAGNOSIS — Z98.890 POST-OPERATIVE STATE: ICD-10-CM

## 2024-07-19 DIAGNOSIS — S63.055A CMC (CARPOMETACARPAL JOINT) DISLOCATION, LEFT, INITIAL ENCOUNTER: Primary | ICD-10-CM

## 2024-07-19 PROCEDURE — 97110 THERAPEUTIC EXERCISES: CPT

## 2024-07-19 NOTE — PROGRESS NOTES
Daily Note     Today's date: 2024  Patient name: Misha Gonzalez  : 1960  MRN: [id number]  Referring provider: Ngoc Campos,*  Dx:   Encounter Diagnosis     ICD-10-CM    1. CMC (carpometacarpal joint) dislocation, left, initial encounter  S63.055A       2. Post-operative state  Z98.890       3. Postoperative state  Z98.890           Start Time: 09  Stop Time: 1015  Total time in clinic (min): 40 minutes    Subjective: Patient offers no functional changes this date.         Objective: See treatment diary below.             Precautions:     Tubigrip Size F   S/P Debridement Hand/finger (wash Out) - Left, Open Reduction W/ Internal Fixation (orif) Hand - Left, and Debridement Wound (wash Out) - Left on 6/15/2024     Therapy for resting hand splint, pin care, and wound care.  Home exercise program will be given to the patient to work on wrist range of motion and IP joint range of motion.  Edema management.    *NWB L hand/wrist*    Fabrication of resting hand splint.  Thumb free.  Ips and be free.  Scar massage once sutures out.  Leave sutures in for one more week  Pin care instructions  Edema management     Modalities per therapist's discretion     ROM:  Elbow, forearm, wrist (radiocarpal joint) and fingers   __x__ Active  __x__ Active assist  __x__ Passive- fingers  ____ Resistive/Strengthening      Auth Tracker  Auth Status Total   Visits  Expiration date Co-Insurance   Approved 12 24                                Visit Tracker: AUTH REQ. BOMN. Copay: $25  Date   IE 7/3 7/8 7/10 7/15 7/19       RE  X                                                                        PMHx:   has a past medical history of Anxiety and Hypertension.         Manuals HEP 2024   STM/edema management                    Ther Ex     Education on HEP and dx  x5min   Therapist assisted AAROM of wrist/hand and gentle PASSIVE fingers  x10min   AROM hook fist x x10   AROM wrist flex/ext/RD/UD x x10   AROM  forearm rotation x x10   Wound care management  x5min   Towel scrunches  x10   Juxaciser  x5   Functional dexterity wooden pegs IHM  Whole container - 16 pegs   Wrist & forearm AROM w/ flexbar     Gentle sponge squeezes x HEP    Yellow  x10-15   Dice, Opposition  Whole container   Elbow ROM with cones          Modalities     MHP  x5min          Assessment:   Patient tolerated session well. Session focused on HEP education, wound care management, range of motion, and patient education  to improve functional task performance with daily activities. Patient tolerated all MT and TE with good tolerance and minimal complaints of pain requiring rest breaks as needed.  Patient progressing well towards goals. Patient benefiting from skilled hand therapy OT to reduce deficits to improve independence with daily activities.        Plan:   Focus on AROM, AAROM, PROM of fingers, custom orthotic splint, train and fit orthotics, edema management, scar massage, pin care hygiene management, and all modalities as seen fit to improve ability to complete daily activites with ease.    POC 7/1/2024 - 8/12/2024

## 2024-07-22 ENCOUNTER — OFFICE VISIT (OUTPATIENT)
Dept: OCCUPATIONAL THERAPY | Facility: CLINIC | Age: 64
End: 2024-07-22
Payer: COMMERCIAL

## 2024-07-22 DIAGNOSIS — S63.055A CMC (CARPOMETACARPAL JOINT) DISLOCATION, LEFT, INITIAL ENCOUNTER: Primary | ICD-10-CM

## 2024-07-22 DIAGNOSIS — Z98.890 POSTOPERATIVE STATE: ICD-10-CM

## 2024-07-22 DIAGNOSIS — Z98.890 POST-OPERATIVE STATE: ICD-10-CM

## 2024-07-22 PROCEDURE — 97110 THERAPEUTIC EXERCISES: CPT

## 2024-07-22 PROCEDURE — 97140 MANUAL THERAPY 1/> REGIONS: CPT

## 2024-07-22 NOTE — PROGRESS NOTES
Daily Note     Today's date: 2024  Patient name: Misha Gonzalez  : 1960  MRN: [id number]  Referring provider: Ngoc Campos,*  Dx:   Encounter Diagnosis     ICD-10-CM    1. CMC (carpometacarpal joint) dislocation, left, initial encounter  S63.055A       2. Post-operative state  Z98.890       3. Postoperative state  Z98.890           Start Time: 854  Stop Time: 954  Total time in clinic (min): 60 minutes    Subjective:Patient notes that one of his pins got caught on his shirt when completing ADLs approximately 2 days ago where the pin was subsequently removed from the hand. Patient notes no pain or discomfort after incident.           Objective: See treatment diary below.      Current measurements as of 2024:  Active Range of Motion      Left Elbow   Flexion: WFL  Extension: WFL  Forearm supination: 75 degrees     Forearm pronation: 78 degrees      Left Wrist   Wrist flexion: 35 degrees  Wrist extension: 43 degrees    Radial deviation: 10 degrees  Ulnar deviation: 10 degrees        Left:  Thumb AROM:     Extension:  CMC: 4 degrees  MP: 0 degrees  IP: 0 degrees     Flexion:  CMC: 2 degrees  0   MP: 40 degrees  40  IP: 48 degrees 46        Radial abduction: 35 degrees  Palmar abduction: 40 degrees          Left Digits    Flexion   Index     MCP: 14    PIP: 56    DIP: 26  Middle     MCP: 20    PIP: 70    DIP: 12  Ring     MCP: 10    PIP: 68    DIP: 20  Little     MCP: 28    PIP: 52    DIP: 16        Additional Active Range of Motion Details  - inability to make a full composite fist of digits to DPC of the LUE secondary to pins and swelling noted.; approximately 5.5cm from DPC      + full composite fist; full flexion and extension of digits, RUE     Strength/Myotome Testing      Additional Strength Details  Functional strength deferred at this time.      Swelling      Left Wrist/Hand   Figure 8: 5 51cm (-1.0cm)   Circumference MCP:  23.5 cm (-1.0cm).  Circumference wrist: 18.4cm                Precautions:     Tubigrip Size F   S/P Debridement Hand/finger (wash Out) - Left, Open Reduction W/ Internal Fixation (orif) Hand - Left, and Debridement Wound (wash Out) - Left on 6/15/2024     Therapy for resting hand splint, pin care, and wound care.  Home exercise program will be given to the patient to work on wrist range of motion and IP joint range of motion.  Edema management.    *NWB L hand/wrist*    Fabrication of resting hand splint.  Thumb free.  Ips and be free.  Scar massage once sutures out.  Leave sutures in for one more week  Pin care instructions  Edema management     Modalities per therapist's discretion     ROM:  Elbow, forearm, wrist (radiocarpal joint) and fingers   __x__ Active  __x__ Active assist  __x__ Passive- fingers  ____ Resistive/Strengthening      Auth Tracker  Auth Status Total   Visits  Expiration date Co-Insurance   Approved 12 9/23/24                                Visit Tracker: AUTH REQ. BOMN. Copay: $25  Date 7/1  IE 7/3 7/8 7/10 7/15 7/19    7/22   RE  X                                                                        PMHx:   has a past medical history of Anxiety and Hypertension.         Manuals HEP 7/22/2024   STM/edema management  x15min                  Ther Ex     Education on HEP and dx  x5min   Therapist assisted AAROM of wrist/hand and gentle PASSIVE fingers  x10min   AROM hook fist x x10   AROM wrist flex/ext/RD/UD x x10   AROM forearm rotation x x10   Wound care management  x5min   Towel scrunches  x10   Juxaciser  x5   Functional dexterity wooden pegs IHM  Whole container - 16 pegs   Wrist & forearm AROM w/ flexbar  x20   Gentle sponge squeezes x HEP    Yellow  x10-15   Dice, Opposition  Whole container             Modalities     MHP  x5min          Assessment:   Patient tolerated session well. Patient demonstrates ROM deficits secondary to swelling localized to the hand. Please see objective section of daily note for current ROM measurements. Patient was  provided an edema mobilization pad which consists of sponge foam scrapes with stockinette and was instructed to place pad directly on skin as indicated using light Tubigrip compression sleeve to assist with edema management. Patient is scheduled for follow up with Dr. Campos tomorrow, 7/23/224. Session focused on HEP education, wound care management, range of motion, and patient education  to improve functional task performance with daily activities. Patient tolerated all MT and TE with good tolerance and minimal complaints of pain requiring rest breaks as needed.  Patient progressing well towards goals. Patient benefiting from skilled hand therapy OT to reduce deficits to improve independence with daily activities.        Plan:   Focus on AROM, AAROM, PROM of fingers, custom orthotic splint, train and fit orthotics, edema management, scar massage, pin care hygiene management, and all modalities as seen fit to improve ability to complete daily activites with ease.    POC 7/1/2024 - 8/12/2024

## 2024-07-23 ENCOUNTER — APPOINTMENT (OUTPATIENT)
Dept: RADIOLOGY | Facility: CLINIC | Age: 64
End: 2024-07-23
Payer: COMMERCIAL

## 2024-07-23 ENCOUNTER — OFFICE VISIT (OUTPATIENT)
Dept: OBGYN CLINIC | Facility: CLINIC | Age: 64
End: 2024-07-23

## 2024-07-23 VITALS
DIASTOLIC BLOOD PRESSURE: 70 MMHG | BODY MASS INDEX: 27.85 KG/M2 | HEIGHT: 69 IN | WEIGHT: 188 LBS | SYSTOLIC BLOOD PRESSURE: 102 MMHG

## 2024-07-23 DIAGNOSIS — Z98.890 POSTOPERATIVE STATE: Primary | ICD-10-CM

## 2024-07-23 DIAGNOSIS — Z98.890 POSTOPERATIVE STATE: ICD-10-CM

## 2024-07-23 PROCEDURE — 99024 POSTOP FOLLOW-UP VISIT: CPT | Performed by: ORTHOPAEDIC SURGERY

## 2024-07-23 PROCEDURE — 73130 X-RAY EXAM OF HAND: CPT

## 2024-07-23 NOTE — PROGRESS NOTES
"Assessment:   S/P Debridement Hand/finger (wash Out) - Left, Open Reduction W/ Internal Fixation (orif) Hand - Left, and Debridement Wound (wash Out) - Left on 6/15/2024    Plan:   therapy, bracing, scar tissue massage, and activity limitations to include no lifting, pushing, pulling, grasping, or gripping with the left hand.  I specifically advised the patient to avoid riding any motorcycle type activity including the [id number], because he is not able to bend his fingers to  and control such a vehicle.    Follow Up:  6  week(s)    To Do Next Visit:    and X-rays of the  left  hand      CHIEF COMPLAINT:  Chief Complaint   Patient presents with    Left Hand - Follow-up         SUBJECTIVE:  Misha Gonzalez is a 63 y.o. male who presents for follow up after Debridement Hand/finger (wash Out) - Left, Open Reduction W/ Internal Fixation (orif) Hand - Left, and Debridement Wound (wash Out) - Left on 6/15/2024.  Today patient has Stiffness/LROM.  Misha presented today without his splint.  He was wearing the edema sleeve that was provided to him by the therapist.  He reported he accidentally removed one of the k-wires. He wanted to know if he can start riding his side by side motorcycle.      PHYSICAL EXAMINATION:  Vital signs: /70 (BP Location: Left arm, Patient Position: Sitting, Cuff Size: Adult)   Ht 5' 9\" (1.753 m) Comment: verbal  Wt 85.3 kg (188 lb)   BMI 27.76 kg/m²   General: well developed and well nourished, alert, oriented times 3, and appears comfortable  Psychiatric: Normal    MUSCULOSKELETAL EXAMINATION:  Incision:  Scars is remodeling on palm of hand  Range of Motion: As expected and Limited due to stiffness.  The wrist and fingers are stiff.  Neurovascular status: Neuro intact, good cap refill  Activity Restrictions: Restrictions: no lifting, pushing, pulling, gripping, or grasping with left hand  Done today: Pins removed      STUDIES REVIEWED:  X-rays of left hand obtained and reviewed.  One " k-wire missing.  Other two k-wires intact with no change in position. No lucencies.  CMC joints remain in reduced, anatomic position.    PROCEDURES PERFORMED:  Procedures   K-wire removal x 2

## 2024-07-24 ENCOUNTER — OFFICE VISIT (OUTPATIENT)
Dept: OCCUPATIONAL THERAPY | Facility: CLINIC | Age: 64
End: 2024-07-24
Payer: COMMERCIAL

## 2024-07-24 DIAGNOSIS — S63.055A CMC (CARPOMETACARPAL JOINT) DISLOCATION, LEFT, INITIAL ENCOUNTER: Primary | ICD-10-CM

## 2024-07-24 DIAGNOSIS — Z98.890 POST-OPERATIVE STATE: ICD-10-CM

## 2024-07-24 DIAGNOSIS — Z98.890 POSTOPERATIVE STATE: ICD-10-CM

## 2024-07-24 PROCEDURE — 97140 MANUAL THERAPY 1/> REGIONS: CPT

## 2024-07-24 PROCEDURE — 97110 THERAPEUTIC EXERCISES: CPT

## 2024-07-24 NOTE — PROGRESS NOTES
Daily Note     Today's date: 2024  Patient name: Misha Gonzalez  : 1960  MRN: [id number]  Referring provider: Ngoc Campos,*  Dx:   Encounter Diagnosis     ICD-10-CM    1. CMC (carpometacarpal joint) dislocation, left, initial encounter  S63.055A       2. Post-operative state  Z98.890       3. Postoperative state  Z98.890           Start Time: 0845  Stop Time: 930  Total time in clinic (min): 45 minutes    Subjective:Patient offers no functional changes this date. Patient had follow up with Dr. Campos yesterday where the two remaining K-wires/pins were removed at the office. Patient was instructed to continue with occupational therapy services to focus on L ROM, scar management and edema management. Patient was further instructed regarding activity limitations to include no lifting, pushing, pulling, grasping, or gripping with the left hand.           Objective: See treatment diary below.                  Precautions:     Tubigrip Size F   S/P Debridement Hand/finger (wash Out) - Left, Open Reduction W/ Internal Fixation (orif) Hand - Left, and Debridement Wound (wash Out) - Left on 6/15/2024     Therapy for resting hand splint, pin care, and wound care.  Home exercise program will be given to the patient to work on wrist range of motion and IP joint range of motion.  Edema management.    *NWB L hand/wrist*    Fabrication of resting hand splint.  Thumb free.  Ips and be free.  Scar massage once sutures out.  Leave sutures in for one more week  Pin care instructions  Edema management     Modalities per therapist's discretion     ROM:  Elbow, forearm, wrist (radiocarpal joint) and fingers   __x__ Active  __x__ Active assist  __x__ Passive- fingers  ____ Resistive/Strengthening      Auth Tracker  Auth Status Total   Visits  Expiration date Co-Insurance   Approved 24                                Visit Tracker: AUTH CATHERINEQ. BOMN. Copay: $25  Date   IE 7/3 7/8 7/10 7/15 7/19    7/22 7/24   RE  X                                                                        PMHx:   has a past medical history of Anxiety and Hypertension.         Manuals HEP 7/24/2024   STM/edema management/retrograde massage  x10min                  Ther Ex     Education on HEP and dx  x5min   Therapist assisted AAROM of wrist/hand and gentle PASSIVE fingers  x15min   AROM hook fist x x10   AROM wrist flex/ext/RD/UD x x10   AROM forearm rotation x x10   Wound care management  x5min   Towel scrunches  x10   Juxaciser  x5   Functional dexterity wooden pegs IHM     Wrist & forearm AROM w/ flexbar  x20   Gentle sponge squeezes x HEP    Yellow  x10-15   Dice, Opposition  Whole container             Modalities     MHP  x5min          Assessment:   Patient tolerated session well. Educated patient on retrograde massage to perform 3x/day. Provided patient with information regarding appropriate edema gloves to wear to assist with edema swelling of the hand and digits. Session focused on HEP education, edema management, wound care management, range of motion, and patient education  to improve functional task performance with daily activities. Patient tolerated all MT and TE with good tolerance and minimal complaints of pain requiring rest breaks as needed.  Patient progressing well towards goals. Patient benefiting from skilled hand therapy OT to reduce deficits to improve independence with daily activities.        Plan:   Focus on AROM, AAROM, PROM of fingers, custom orthotic splint, train and fit orthotics, edema management, scar massage, pin care hygiene management, and all modalities as seen fit to improve ability to complete daily activites with ease.    POC 7/1/2024 - 8/12/2024

## 2024-07-26 ENCOUNTER — OFFICE VISIT (OUTPATIENT)
Dept: OCCUPATIONAL THERAPY | Facility: CLINIC | Age: 64
End: 2024-07-26
Payer: COMMERCIAL

## 2024-07-26 DIAGNOSIS — Z98.890 POSTOPERATIVE STATE: ICD-10-CM

## 2024-07-26 DIAGNOSIS — S63.055A CMC (CARPOMETACARPAL JOINT) DISLOCATION, LEFT, INITIAL ENCOUNTER: Primary | ICD-10-CM

## 2024-07-26 DIAGNOSIS — Z98.890 POST-OPERATIVE STATE: ICD-10-CM

## 2024-07-26 PROCEDURE — 97140 MANUAL THERAPY 1/> REGIONS: CPT

## 2024-07-26 PROCEDURE — 97110 THERAPEUTIC EXERCISES: CPT

## 2024-07-26 NOTE — PROGRESS NOTES
Daily Note     Today's date: 2024  Patient name: Misha Gonzalez  : 1960  MRN: [id number]  Referring provider: Ngco Campos,*  Dx:   Encounter Diagnosis     ICD-10-CM    1. CMC (carpometacarpal joint) dislocation, left, initial encounter  S63.055A       2. Post-operative state  Z98.890       3. Postoperative state  Z98.890           Start Time: 1015  Stop Time: 1100  Total time in clinic (min): 45 minutes    Subjective:Patient offers no functional changes this date. Patient notes ordering the recommended edema glove to assist with UE swelling.           Objective: See treatment diary below.    Current measurements for swelling 2024:     22.3 cm MCP  17.5 cm wrist   49.5cm figure 8           Precautions:     Tubigrip Size F   S/P Debridement Hand/finger (wash Out) - Left, Open Reduction W/ Internal Fixation (orif) Hand - Left, and Debridement Wound (wash Out) - Left on 6/15/2024     Therapy for resting hand splint, pin care, and wound care.  Home exercise program will be given to the patient to work on wrist range of motion and IP joint range of motion.  Edema management.    *NWB L hand/wrist*    Fabrication of resting hand splint.  Thumb free.  Ips and be free.  Scar massage once sutures out.  Leave sutures in for one more week  Pin care instructions  Edema management     Modalities per therapist's discretion     ROM:  Elbow, forearm, wrist (radiocarpal joint) and fingers   __x__ Active  __x__ Active assist  __x__ Passive- fingers  ____ Resistive/Strengthening      Auth Tracker  Auth Status Total   Visits  Expiration date Co-Insurance   Approved 24                                Visit Tracker: AUTH REQ. BOMN. Copay: $25  Date   IE 7/3 7/8 7/10 7/15 7/19    7/22 7/24 7/26 RE  X                                                                        PMHx:   has a past medical history of Anxiety and Hypertension.         Manuals HEP 2024   STM/edema management/retrograde massage   x15min                  Ther Ex     Education on HEP and dx  x5min   Therapist assisted AAROM of wrist/hand and gentle PASSIVE fingers  x10min   AROM hook fist x x10   AROM wrist flex/ext/RD/UD x x10   AROM forearm rotation x x10   Wound care management  x5min   Towel scrunches  x10   Juxaciser  x10   Functional dexterity wooden pegs IHM  Whole container    Wrist & forearm AROM w/ flexbar  x20   Gentle sponge squeezes x HEP    Yellow  x10-15             Modalities     MHP  x5min          Assessment:   Patient tolerated session well. Educated patient on retrograde massage to perform 3x/day. Provided patient with information regarding appropriate edema gloves to wear to assist with edema swelling of the hand and digits. Session focused on HEP education, edema management, wound care management, range of motion, and patient education  to improve functional task performance with daily activities. Patient tolerated all MT and TE with good tolerance and minimal complaints of pain requiring rest breaks as needed.  Patient progressing well towards goals. Patient benefiting from skilled hand therapy OT to reduce deficits to improve independence with daily activities.        Plan:   Focus on AROM, AAROM, PROM of fingers, custom orthotic splint, train and fit orthotics, edema management, scar massage, pin care hygiene management, and all modalities as seen fit to improve ability to complete daily activites with ease.    POC 7/1/2024 - 8/12/2024

## 2024-07-29 ENCOUNTER — EVALUATION (OUTPATIENT)
Dept: OCCUPATIONAL THERAPY | Facility: CLINIC | Age: 64
End: 2024-07-29
Payer: COMMERCIAL

## 2024-07-29 DIAGNOSIS — S63.055A CMC (CARPOMETACARPAL JOINT) DISLOCATION, LEFT, INITIAL ENCOUNTER: Primary | ICD-10-CM

## 2024-07-29 DIAGNOSIS — Z98.890 POST-OPERATIVE STATE: ICD-10-CM

## 2024-07-29 DIAGNOSIS — Z98.890 POSTOPERATIVE STATE: ICD-10-CM

## 2024-07-29 PROCEDURE — 97110 THERAPEUTIC EXERCISES: CPT

## 2024-07-29 PROCEDURE — 97140 MANUAL THERAPY 1/> REGIONS: CPT
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
## 2024-07-31 ENCOUNTER — OFFICE VISIT (OUTPATIENT)
Dept: OCCUPATIONAL THERAPY | Facility: CLINIC | Age: 64
End: 2024-07-31
Payer: COMMERCIAL

## 2024-07-31 DIAGNOSIS — S63.055A CMC (CARPOMETACARPAL JOINT) DISLOCATION, LEFT, INITIAL ENCOUNTER: Primary | ICD-10-CM

## 2024-07-31 DIAGNOSIS — Z98.890 POST-OPERATIVE STATE: ICD-10-CM

## 2024-07-31 DIAGNOSIS — Z98.890 POSTOPERATIVE STATE: ICD-10-CM

## 2024-07-31 PROCEDURE — 97110 THERAPEUTIC EXERCISES: CPT

## 2024-07-31 PROCEDURE — 97140 MANUAL THERAPY 1/> REGIONS: CPT

## 2024-07-31 NOTE — PROGRESS NOTES
Daily Note     Today's date: 2024  Patient name: Misha Gonzalez  : 1960  MRN: [id number]  Referring provider: Ngoc Campos,*  Dx:   Encounter Diagnosis     ICD-10-CM    1. CMC (carpometacarpal joint) dislocation, left, initial encounter  S63.055A       2. Post-operative state  Z98.890       3. Postoperative state  Z98.890           Start Time: 0845  Stop Time: 930  Total time in clinic (min): 45 minutes    Subjective:Patient arrived to today's session wearing the recommended edema glove to assist with UE swelling.            Objective: See treatment diary below.             Precautions:     Tubigrip Size F   S/P Debridement Hand/finger (wash Out) - Left, Open Reduction W/ Internal Fixation (orif) Hand - Left, and Debridement Wound (wash Out) - Left on 6/15/2024     Therapy for resting hand splint, pin care, and wound care.  Home exercise program will be given to the patient to work on wrist range of motion and IP joint range of motion.  Edema management.    *NWB L hand/wrist*    Fabrication of resting hand splint.  Thumb free.  Ips and be free.  Scar massage once sutures out.  Leave sutures in for one more week  Pin care instructions  Edema management     Modalities per therapist's discretion     ROM:  Elbow, forearm, wrist (radiocarpal joint) and fingers   __x__ Active  __x__ Active assist  __x__ Passive- fingers  ____ Resistive/Strengthening      Auth Tracker  Auth Status Total   Visits  Expiration date Co-Insurance   Approved 24                                Visit Tracker: AUTH REQ. BOMN. Copay: $25  Date   IE 7/3 7/8 7/10 7/15 7/19    7/22 7/24 7/26 7/29  RE  X                                                                        PMHx:   has a past medical history of Anxiety and Hypertension.         Manuals HEP 2024   STM/edema management/retrograde massage  x15min                  Ther Ex     Education on HEP and dx  x5min   Therapist assisted AAROM of  wrist/hand and gentle PASSIVE fingers  x10min   AROM hook fist x x10   AROM wrist flex/ext/RD/UD x x10   AROM forearm rotation x x10   Towel scrunches  x10   Juxaciser  x10   Functional dexterity wooden pegs IHM     Wrist & forearm AROM w/ flexbar  x20   Gentle sponge squeezes x HEP    Yellow  x10-15   Wrist AAROM with ball  x10   Power web digit flexion  2x10  Yellow    Digit extension rubberband  2x10  Yellow         Modalities     MHP with coban wrap to digits  x5min          Assessment:   Patient tolerated session well. Session focused on HEP education, retrograde, massage, edema management, wound care management, range of motion, gentle strengthening of digits and patient education  to improve functional task performance with daily activities. Patient tolerated all MT and TE with good tolerance and minimal complaints of pain requiring rest breaks as needed.  Patient progressing well towards goals. Patient would benefit from continued skilled hand therapy OT to reduce deficits to improve independence with daily activities.        Plan:   Focus on AROM, AAROM, PROM of fingers, custom orthotic splint, train and fit orthotics, edema management, scar massage, pin care hygiene management, and all modalities as seen fit to improve ability to complete daily activites with ease.    POC 7/29/2024 - 9/23/2024

## 2024-08-02 ENCOUNTER — OFFICE VISIT (OUTPATIENT)
Dept: OCCUPATIONAL THERAPY | Facility: CLINIC | Age: 64
End: 2024-08-02
Payer: COMMERCIAL

## 2024-08-02 DIAGNOSIS — S63.055A CMC (CARPOMETACARPAL JOINT) DISLOCATION, LEFT, INITIAL ENCOUNTER: Primary | ICD-10-CM

## 2024-08-02 DIAGNOSIS — Z98.890 POSTOPERATIVE STATE: ICD-10-CM

## 2024-08-02 DIAGNOSIS — Z98.890 POST-OPERATIVE STATE: ICD-10-CM

## 2024-08-02 PROCEDURE — 97140 MANUAL THERAPY 1/> REGIONS: CPT

## 2024-08-02 PROCEDURE — 97110 THERAPEUTIC EXERCISES: CPT

## 2024-08-02 NOTE — PROGRESS NOTES
Daily Note     Today's date: 2024  Patient name: Misha Gonzalez  : 1960  MRN: [id number]  Referring provider: Ngoc Campos,*  Dx:   Encounter Diagnosis     ICD-10-CM    1. CMC (carpometacarpal joint) dislocation, left, initial encounter  S63.055A       2. Post-operative state  Z98.890       3. Postoperative state  Z98.890           Start Time: 0800  Stop Time: 0845  Total time in clinic (min): 45 minutes    Subjective: Patient offers no functional changes this date.           Objective: See treatment diary below.             Precautions:     Tubigrip Size F   S/P Debridement Hand/finger (wash Out) - Left, Open Reduction W/ Internal Fixation (orif) Hand - Left, and Debridement Wound (wash Out) - Left on 6/15/2024     Therapy for resting hand splint, pin care, and wound care.  Home exercise program will be given to the patient to work on wrist range of motion and IP joint range of motion.  Edema management.    *NWB L hand/wrist*    Fabrication of resting hand splint.  Thumb free.  Ips and be free.  Scar massage once sutures out.  Leave sutures in for one more week  Pin care instructions  Edema management     Modalities per therapist's discretion     ROM:  Elbow, forearm, wrist (radiocarpal joint) and fingers   __x__ Active  __x__ Active assist  __x__ Passive- fingers  ____ Resistive/Strengthening      Auth Tracker  Auth Status Total   Visits  Expiration date Co-Insurance   Approved 12 24    Pending Approval    Requested 12 visits                             Visit Tracker: AUTH REQ. BOMN. Copay: $25  Date   IE 7/3 7/8 7/10 7/15 7/19    7/22 7/24 7/26 7/29  RE   X                                                                        PMHx:   has a past medical history of Anxiety and Hypertension.         Manuals HEP 2024   STM/edema management/retrograde massage  x15min                  Ther Ex     Education on HEP and dx  x5min   Therapist assisted AAROM of wrist/hand and  Patient placed on continuous cardiac monitor, automatic blood pressure cuff and continuous pulse oximeter.   gentle PASSIVE fingers  x10min   AROM hook fist x x10   AROM wrist flex/ext/RD/UD x 2x10   AROM forearm rotation x x10   Towel scrunches  x10   Juxaciser  x10   Functional dexterity wooden pegs IHM     Wrist & forearm AROM w/ flexbar  x20   Gentle sponge squeezes x HEP    Yellow  x10-15   Wrist & FA AAROM with ball  2x10   Power web digit flexion  2x10  Yellow    Digit extension rubberband  2x10  Yellow    Digit flex  2x10  Yellow         Modalities     MHP   x5min          Assessment:   Patient tolerated session well. Session focused on HEP education, retrograde, massage, edema management, wound care management, range of motion, gentle strengthening of digits and patient education  to improve functional task performance with daily activities. Patient tolerated all MT and TE with good tolerance and minimal complaints of pain requiring rest breaks as needed.  Patient progressing well towards goals. Patient would benefit from continued skilled hand therapy OT to reduce deficits to improve independence with daily activities.        Plan:   Focus on AROM, AAROM, PROM of fingers, custom orthotic splint, train and fit orthotics, edema management, scar massage, pin care hygiene management, and all modalities as seen fit to improve ability to complete daily activites with ease.    POC 7/29/2024 - 9/23/2024

## 2024-08-05 ENCOUNTER — OFFICE VISIT (OUTPATIENT)
Dept: OCCUPATIONAL THERAPY | Facility: CLINIC | Age: 64
End: 2024-08-05
Payer: COMMERCIAL

## 2024-08-05 DIAGNOSIS — S63.055A CMC (CARPOMETACARPAL JOINT) DISLOCATION, LEFT, INITIAL ENCOUNTER: Primary | ICD-10-CM

## 2024-08-05 DIAGNOSIS — Z98.890 POST-OPERATIVE STATE: ICD-10-CM

## 2024-08-05 DIAGNOSIS — Z98.890 POSTOPERATIVE STATE: ICD-10-CM

## 2024-08-05 PROCEDURE — 97110 THERAPEUTIC EXERCISES: CPT

## 2024-08-05 PROCEDURE — 97140 MANUAL THERAPY 1/> REGIONS: CPT

## 2024-08-05 NOTE — PROGRESS NOTES
Daily Note     Today's date: 2024  Patient name: Misha Gonzalez  : 1960  MRN: [id number]  Referring provider: Ngoc Campos,*  Dx:   Encounter Diagnosis     ICD-10-CM    1. CMC (carpometacarpal joint) dislocation, left, initial encounter  S63.055A       2. Post-operative state  Z98.890       3. Postoperative state  Z98.890           Start Time: 0800  Stop Time: 0845  Total time in clinic (min): 45 minutes    Subjective: Patient offers no functional changes this date.           Objective: See treatment diary below.    MCP: 21.6cms  Wrist: 16.7 cms  Figure 8: 47.4cm             Precautions:     Tubigrip Size F   S/P Debridement Hand/finger (wash Out) - Left, Open Reduction W/ Internal Fixation (orif) Hand - Left, and Debridement Wound (wash Out) - Left on 6/15/2024     Therapy for resting hand splint, pin care, and wound care.  Home exercise program will be given to the patient to work on wrist range of motion and IP joint range of motion.  Edema management.    *NWB L hand/wrist*    Fabrication of resting hand splint.  Thumb free.  Ips and be free.  Scar massage once sutures out.  Leave sutures in for one more week  Pin care instructions  Edema management     Modalities per therapist's discretion     ROM:  Elbow, forearm, wrist (radiocarpal joint) and fingers   __x__ Active  __x__ Active assist  __x__ Passive- fingers  ____ Resistive/Strengthening      Auth Tracker  Auth Status Total   Visits  Expiration date Co-Insurance   Approved 24    Approved 12 10/26/24                           Visit Tracker: AUTH REQ. BOMN. Copay: $25  Date   IE 7/3 7/8 7/10 7/15 7/19    7/22 7/24 7/26 7/29  RE  8/2  X    8/5              X                                                      PMHx:   has a past medical history of Anxiety and Hypertension.         Manuals HEP 2024   STM/edema management/retrograde massage  x10min                  Ther Ex     Education on HEP and dx  x5min   Therapist  assisted AAROM of wrist/hand and gentle PASSIVE fingers  x10min   AROM hook fist x x10   AROM wrist flex/ext/RD/UD x 2x10   AROM forearm rotation x x10   Towel scrunches  x10   Juxaciser  x10   Functional dexterity Dice, IHM  Whole container    Wrist & forearm AROM w/ flexbar  x20   Gentle sponge squeezes x HEP    Yellow  x10-15   Wrist & FA AAROM with ball  2x10   Power web digit flexion  2x10  Yellow    Digit extension rubberband, thumb radial/palmar abd  2x10  red   Digit flex  2x10  red        Modalities     MHP   x5min          Assessment:   Patient tolerated session well. Session focused on HEP education, retrograde, massage, edema management, wound care management, range of motion, gentle strengthening of digits and patient education  to improve functional task performance with daily activities. Patient tolerated all MT and TE with good tolerance and minimal complaints of pain requiring rest breaks as needed.  Patient progressing well towards goals. Patient would benefit from continued skilled hand therapy OT to reduce deficits to improve independence with daily activities.        Plan:   Focus on AROM, AAROM, PROM of fingers, custom orthotic splint, train and fit orthotics, edema management, scar massage, pin care hygiene management, and all modalities as seen fit to improve ability to complete daily activites with ease.    POC 7/29/2024 - 9/23/2024

## 2024-08-07 ENCOUNTER — OFFICE VISIT (OUTPATIENT)
Dept: OCCUPATIONAL THERAPY | Facility: CLINIC | Age: 64
End: 2024-08-07
Payer: COMMERCIAL

## 2024-08-07 DIAGNOSIS — Z98.890 POSTOPERATIVE STATE: ICD-10-CM

## 2024-08-07 DIAGNOSIS — S63.055A CMC (CARPOMETACARPAL JOINT) DISLOCATION, LEFT, INITIAL ENCOUNTER: Primary | ICD-10-CM

## 2024-08-07 DIAGNOSIS — Z98.890 POST-OPERATIVE STATE: ICD-10-CM

## 2024-08-07 PROCEDURE — 97110 THERAPEUTIC EXERCISES: CPT

## 2024-08-07 PROCEDURE — 97140 MANUAL THERAPY 1/> REGIONS: CPT

## 2024-08-07 NOTE — PROGRESS NOTES
Daily Note     Today's date: 2024  Patient name: Misha Gonzalez  : 1960  MRN: [id number]  Referring provider: Ngoc Campos,*  Dx:   Encounter Diagnosis     ICD-10-CM    1. CMC (carpometacarpal joint) dislocation, left, initial encounter  S63.055A       2. Post-operative state  Z98.890       3. Postoperative state  Z98.890           Start Time: 0815  Stop Time: 0900  Total time in clinic (min): 45 minutes    Subjective: Patient offers no functional changes this date.           Objective: See treatment diary below.               Precautions:     Tubigrip Size F   S/P Debridement Hand/finger (wash Out) - Left, Open Reduction W/ Internal Fixation (orif) Hand - Left, and Debridement Wound (wash Out) - Left on 6/15/2024     Therapy for resting hand splint, pin care, and wound care.  Home exercise program will be given to the patient to work on wrist range of motion and IP joint range of motion.  Edema management.    *NWB L hand/wrist*    Fabrication of resting hand splint.  Thumb free.  Ips and be free.  Scar massage once sutures out.  Leave sutures in for one more week  Pin care instructions  Edema management     Modalities per therapist's discretion     ROM:  Elbow, forearm, wrist (radiocarpal joint) and fingers   __x__ Active  __x__ Active assist  __x__ Passive- fingers  ____ Resistive/Strengthening      Auth Tracker  Auth Status Total   Visits  Expiration date Co-Insurance   Approved 24    Approved 12 10/26/24                           Visit Tracker: AUTH REQ. BOMN. Copay: $25  Date   IE 7/3 7/8 7/10 7/15 7/19    7/22 7/24 7/26 7/29  RE  8/2  X    8/5 8/7             X                                                      PMHx:   has a past medical history of Anxiety and Hypertension.         Manuals HEP 2024   STM/edema management/retrograde massage  x10min                  Ther Ex     Education on HEP and dx  x5min   Therapist assisted AAROM of wrist/hand and gentle PASSIVE  fingers  x10min   AROM hook fist x x10   AROM wrist flex/ext/RD/UD x 2x10   AROM forearm rotation x x10   Towel scrunches  x10   Juxaciser  x10   Functional dexterity Dice, IHM, opposition   Whole container    Wrist & forearm AROM w/ flexbar  3x10   Gentle sponge squeezes x HEP    Yellow  x10-15   Wrist & FA AAROM with ball  3x10   Power web digit flexion  2x10  Yellow    Digit extension rubberband, thumb radial/palmar abd  2x10  red   Digit flex  2x10  red        Modalities     MHP   x5min          Assessment:   Patient tolerated session well. Session focused on HEP education, retrograde, massage, edema management, wound care management, range of motion, gentle strengthening of digits and patient education  to improve functional task performance with daily activities. Patient tolerated all MT and TE with good tolerance and minimal complaints of pain requiring rest breaks as needed.  Patient progressing well towards goals. Patient would benefit from continued skilled hand therapy OT to reduce deficits to improve independence with daily activities.        Plan:   Focus on AROM, AAROM, PROM of fingers, custom orthotic splint, train and fit orthotics, edema management, scar massage, pin care hygiene management, and all modalities as seen fit to improve ability to complete daily activites with ease.    POC 7/29/2024 - 9/23/2024

## 2024-08-09 ENCOUNTER — APPOINTMENT (OUTPATIENT)
Dept: OCCUPATIONAL THERAPY | Facility: CLINIC | Age: 64
End: 2024-08-09
Payer: COMMERCIAL

## 2024-08-12 ENCOUNTER — OFFICE VISIT (OUTPATIENT)
Dept: OCCUPATIONAL THERAPY | Facility: CLINIC | Age: 64
End: 2024-08-12
Payer: COMMERCIAL

## 2024-08-12 DIAGNOSIS — Z98.890 POST-OPERATIVE STATE: ICD-10-CM

## 2024-08-12 DIAGNOSIS — Z98.890 POSTOPERATIVE STATE: ICD-10-CM

## 2024-08-12 DIAGNOSIS — S63.055A CMC (CARPOMETACARPAL JOINT) DISLOCATION, LEFT, INITIAL ENCOUNTER: Primary | ICD-10-CM

## 2024-08-12 PROCEDURE — 97110 THERAPEUTIC EXERCISES: CPT

## 2024-08-12 NOTE — PROGRESS NOTES
Daily Note     Today's date: 2024  Patient name: Misha Gonzalez  : 1960  MRN: [id number]  Referring provider: Ngoc Campos,*  Dx:   Encounter Diagnosis     ICD-10-CM    1. CMC (carpometacarpal joint) dislocation, left, initial encounter  S63.055A       2. Post-operative state  Z98.890       3. Postoperative state  Z98.890           Start Time: 0800  Stop Time: 0845  Total time in clinic (min): 45 minutes    Subjective: Patient offers no functional changes this date.           Objective: See treatment diary below.               Precautions:     Tubigrip Size F   S/P Debridement Hand/finger (wash Out) - Left, Open Reduction W/ Internal Fixation (orif) Hand - Left, and Debridement Wound (wash Out) - Left on 6/15/2024     Therapy for resting hand splint, pin care, and wound care.  Home exercise program will be given to the patient to work on wrist range of motion and IP joint range of motion.  Edema management.    *NWB L hand/wrist*    Fabrication of resting hand splint.  Thumb free.  Ips and be free.  Scar massage once sutures out.  Leave sutures in for one more week  Pin care instructions  Edema management     Modalities per therapist's discretion     ROM:  Elbow, forearm, wrist (radiocarpal joint) and fingers   __x__ Active  __x__ Active assist  __x__ Passive- fingers  ____ Resistive/Strengthening      Auth Tracker  Auth Status Total   Visits  Expiration date Co-Insurance   Approved 24    Approved 12 10/26/24                           Visit Tracker: AUTH REQ. BOMN. Copay: $25  Date   IE 7/3 7/8 7/10 7/15 7/19    7/22 7/24 7/26 7/29  RE 7/31 8/2  X    8/5 8/7 8/12            X                                                      PMHx:   has a past medical history of Anxiety and Hypertension.         Manuals HEP 2024   STM/edema management/retrograde massage  x5min                  Ther Ex     Education on HEP and dx  x5min   Therapist assisted AAROM of wrist/hand and gentle  PASSIVE fingers  x10min   AROM hook fist x x10   AROM wrist flex/ext/RD/UD x 2x10   AROM forearm rotation x x10   Towel scrunches  x5   Juxaciser  x10   Functional dexterity Dice, IHM, opposition      Wrist & forearm AROM w/ flexbar  3x10   Gentle sponge squeezes x HEP    Yellow  x10-15   Wrist & FA AAROM with ball  3x10   Power web digit flexion  2x10  Yellow    Digit extension rubberband, thumb radial/palmar abd  2x10  red   Digit flex  2x10  red   Dice   Red 3pt pinch   Theraputty gentle gripping & pinching  Tan  x15        Modalities     MHP   x5min          Assessment:   Patient tolerated session well. Session focused on HEP education, retrograde, massage, edema management, wound care management, range of motion, gentle strengthening of digits and patient education  to improve functional task performance with daily activities. Patient tolerated all MT and TE with good tolerance and minimal complaints of pain requiring rest breaks as needed.  Patient progressing well towards goals. Patient would benefit from continued skilled hand therapy OT to reduce deficits to improve independence with daily activities.        Plan:   Focus on AROM, AAROM, PROM of fingers, custom orthotic splint, train and fit orthotics, edema management, scar massage, pin care hygiene management, and all modalities as seen fit to improve ability to complete daily activites with ease.    POC 7/29/2024 - 9/23/2024

## 2024-08-14 ENCOUNTER — OFFICE VISIT (OUTPATIENT)
Dept: OCCUPATIONAL THERAPY | Facility: CLINIC | Age: 64
End: 2024-08-14
Payer: COMMERCIAL

## 2024-08-14 DIAGNOSIS — Z98.890 POST-OPERATIVE STATE: ICD-10-CM

## 2024-08-14 DIAGNOSIS — S63.055A CMC (CARPOMETACARPAL JOINT) DISLOCATION, LEFT, INITIAL ENCOUNTER: Primary | ICD-10-CM

## 2024-08-14 DIAGNOSIS — Z98.890 POSTOPERATIVE STATE: ICD-10-CM

## 2024-08-14 PROCEDURE — 97110 THERAPEUTIC EXERCISES: CPT

## 2024-08-14 NOTE — PROGRESS NOTES
Daily Note     Today's date: 2024  Patient name: Misha Gonzalez  : 1960  MRN: [id number]  Referring provider: Ngoc Campos,*  Dx:   Encounter Diagnosis     ICD-10-CM    1. CMC (carpometacarpal joint) dislocation, left, initial encounter  S63.055A       2. Post-operative state  Z98.890       3. Postoperative state  Z98.890           Start Time: 0800  Stop Time: 0845  Total time in clinic (min): 45 minutes    Subjective: Patient notes continued pins and needle sensation to the middle digit only, but notes improvement weekly.           Objective: See treatment diary below.               Precautions:   8 weeks as of 8/10/2024  Tubigrip Size F   S/P Debridement Hand/finger (wash Out) - Left, Open Reduction W/ Internal Fixation (orif) Hand - Left, and Debridement Wound (wash Out) - Left on 6/15/2024     Therapy for resting hand splint, pin care, and wound care.  Home exercise program will be given to the patient to work on wrist range of motion and IP joint range of motion.  Edema management.    *NWB L hand/wrist*    Fabrication of resting hand splint.  Thumb free.  Ips and be free.  Scar massage once sutures out.  Leave sutures in for one more week  Pin care instructions  Edema management     Modalities per therapist's discretion     ROM:  Elbow, forearm, wrist (radiocarpal joint) and fingers   __x__ Active  __x__ Active assist  __x__ Passive- fingers  ____ Resistive/Strengthening      Auth Tracker  Auth Status Total   Visits  Expiration date Co-Insurance   Approved 24    Approved 12 10/26/24                           Visit Tracker: AUTH REQ. BOMN. Copay: $25  Date   IE 7/3 7/8 7/10 7/15 7/19    7/22 7/24 7/26 7/29  RE 7/31 8/2  X    8/5 8/7 8/12 8/14           X                                                      PMHx:   has a past medical history of Anxiety and Hypertension.         Manuals HEP 2024   STM/edema management/retrograde massage  x5min                  Ther Ex      Education on HEP and dx  x5min   Therapist assisted AAROM of wrist/hand and gentle PASSIVE fingers  x10min   AROM hook fist x x10   AROM wrist flex/ext/RD/UD x 2x10   AROM forearm rotation x x10   Towel scrunches  x5   Juxaciser  x10   Functional dexterity Dice, IHM, opposition   1 round    Wrist AROM over table with green ball  x20   Wrist & forearm AROM w/ flexbar  3x10   Gentle sponge squeezes x HEP    Yellow  x10-15   Wrist & FA AAROM with ball  3x10   Power web digit flexion  2x10  Yellow    Digit extension rubberband, thumb radial/palmar abd  2x10  red   Digit flex  2x10  red   Dice   Red 3pt pinch   Theraputty gentle gripping & pinching  Tan  x15        Modalities     MHP   x5min          Assessment:   Patient tolerated session well. Session focused on HEP education, retrograde, massage, edema management, wound care management, range of motion, gentle strengthening of digits and patient education  to improve functional task performance with daily activities. Patient tolerated all MT and TE with good tolerance and minimal complaints of pain requiring rest breaks as needed.  Patient progressing well towards goals. Patient would benefit from continued skilled hand therapy OT to reduce deficits to improve independence with daily activities.        Plan:   Focus on AROM, AAROM, PROM of fingers, custom orthotic splint, train and fit orthotics, edema management, scar massage, pin care hygiene management, and all modalities as seen fit to improve ability to complete daily activites with ease.    POC 7/29/2024 - 9/23/2024

## 2024-08-19 ENCOUNTER — OFFICE VISIT (OUTPATIENT)
Dept: OCCUPATIONAL THERAPY | Facility: CLINIC | Age: 64
End: 2024-08-19
Payer: COMMERCIAL

## 2024-08-19 DIAGNOSIS — Z98.890 POSTOPERATIVE STATE: ICD-10-CM

## 2024-08-19 DIAGNOSIS — S63.055A CMC (CARPOMETACARPAL JOINT) DISLOCATION, LEFT, INITIAL ENCOUNTER: Primary | ICD-10-CM

## 2024-08-19 DIAGNOSIS — Z98.890 POST-OPERATIVE STATE: ICD-10-CM

## 2024-08-19 PROCEDURE — 97110 THERAPEUTIC EXERCISES: CPT

## 2024-08-19 NOTE — PROGRESS NOTES
Daily Note     Today's date: 2024  Patient name: Misha Gonzalez  : 1960  MRN: [id number]  Referring provider: Ngoc Campos,*  Dx:   Encounter Diagnosis     ICD-10-CM    1. CMC (carpometacarpal joint) dislocation, left, initial encounter  S63.055A       2. Post-operative state  Z98.890       3. Postoperative state  Z98.890           Start Time: 0800  Stop Time: 0845  Total time in clinic (min): 45 minutes    Subjective: Patient offers no functional changes this date.        Objective: See treatment diary below.    Swelling:  MCP: 21.9 cm  Wrist: 16.2 cm  Figure 8: 47.5 cm            Precautions:   8 weeks as of 8/10/2024  Tubigrip Size F   S/P Debridement Hand/finger (wash Out) - Left, Open Reduction W/ Internal Fixation (orif) Hand - Left, and Debridement Wound (wash Out) - Left on 6/15/2024     Therapy for resting hand splint, pin care, and wound care.  Home exercise program will be given to the patient to work on wrist range of motion and IP joint range of motion.  Edema management.    *NWB L hand/wrist*    Fabrication of resting hand splint.  Thumb free.  Ips and be free.  Scar massage once sutures out.  Leave sutures in for one more week  Pin care instructions  Edema management     Modalities per therapist's discretion     ROM:  Elbow, forearm, wrist (radiocarpal joint) and fingers   __x__ Active  __x__ Active assist  __x__ Passive- fingers  ____ Resistive/Strengthening      Auth Tracker  Auth Status Total   Visits  Expiration date Co-Insurance   Approved 24    Approved 12 10/26/24                           Visit Tracker: AUTH REQ. BOMN. Copay: $25  Date   IE 7/3 7/8 7/10 7/15 7/19    7/22 7/24 7/26 7/29  RE  8/2  X    8/5 8/7 8/12 8/14 8/19        RE  X                                                      PMHx:   has a past medical history of Anxiety and Hypertension.         Manuals HEP 2024   STM/edema management/retrograde massage  x5min                  Ther Ex      Education on HEP and dx  x5min   Therapist assisted AAROM of wrist/hand and gentle PASSIVE fingers  [id number]   AROM hook fist x x10   AROM wrist flex/ext/RD/UD x 2x10   AROM forearm rotation x x10   Towel scrunches  x5   Juxaciser  x10   Functional dexterity Dice, IHM, opposition      Wrist AROM over table with green ball  x20   Wrist & forearm AROM w/ flexbar  3x10   Wrist & FA AAROM with ball  3x10   Power web digit flexion  2x10  Green    Digit extension rubberband, thumb radial/palmar abd  2x10  red   Digit flex  2x10  red   Dice   Green 3pt pinch   Theraputty gentle gripping & pinching     Nuts and bolts board  1 round of variety sizes (6 sizes)  x8min        Modalities     MHP   x5min          Assessment:   Patient tolerated session well. Session focused on HEP education, retrograde, massage, edema management, wound care management, range of motion, gentle strengthening of digits and patient education  to improve functional task performance with daily activities. Patient tolerated all MT and TE with good tolerance and minimal complaints of pain requiring rest breaks as needed. Patient demonstrates improvement in ROM of digits as evidence by ability to touch DPC of the middle, ring, and small digits and approximately 0.5 to 1.0 cm away from DPC of the index digit.  Patient progressing well towards goals. Patient would benefit from continued skilled hand therapy OT to reduce deficits to improve independence with daily activities.        Plan:   Focus on AROM, AAROM, PROM of fingers, custom orthotic splint, train and fit orthotics, edema management, scar massage, pin care hygiene management, and all modalities as seen fit to improve ability to complete daily activites with ease.    POC 7/29/2024 - 9/23/2024

## 2024-08-21 ENCOUNTER — APPOINTMENT (OUTPATIENT)
Dept: OCCUPATIONAL THERAPY | Facility: CLINIC | Age: 64
End: 2024-08-21
Payer: COMMERCIAL

## 2024-08-26 ENCOUNTER — OFFICE VISIT (OUTPATIENT)
Dept: OCCUPATIONAL THERAPY | Facility: CLINIC | Age: 64
End: 2024-08-26
Payer: COMMERCIAL

## 2024-08-26 DIAGNOSIS — Z98.890 POST-OPERATIVE STATE: ICD-10-CM

## 2024-08-26 DIAGNOSIS — Z98.890 POSTOPERATIVE STATE: ICD-10-CM

## 2024-08-26 DIAGNOSIS — S63.055A CMC (CARPOMETACARPAL JOINT) DISLOCATION, LEFT, INITIAL ENCOUNTER: Primary | ICD-10-CM

## 2024-08-26 PROCEDURE — 97110 THERAPEUTIC EXERCISES: CPT

## 2024-08-26 NOTE — PROGRESS NOTES
Daily Note     Today's date: 2024  Patient name: Misha Gonzalez  : 1960  MRN: [id number]  Referring provider: Ngoc Campos,*  Dx:   Encounter Diagnosis     ICD-10-CM    1. CMC (carpometacarpal joint) dislocation, left, initial encounter  S63.055A       2. Post-operative state  Z98.890       3. Postoperative state  Z98.890           Start Time: 0930  Stop Time: 1015  Total time in clinic (min): 45 minutes    Subjective: Patient offers no functional changes this date.        Objective: See treatment diary below.               Precautions:   8 weeks as of 8/10/2024  Tubigrip Size F   S/P Debridement Hand/finger (wash Out) - Left, Open Reduction W/ Internal Fixation (orif) Hand - Left, and Debridement Wound (wash Out) - Left on 6/15/2024     Therapy for resting hand splint, pin care, and wound care.  Home exercise program will be given to the patient to work on wrist range of motion and IP joint range of motion.  Edema management.    *NWB L hand/wrist*    Fabrication of resting hand splint.  Thumb free.  Ips and be free.  Scar massage once sutures out.  Leave sutures in for one more week  Pin care instructions  Edema management     Modalities per therapist's discretion     ROM:  Elbow, forearm, wrist (radiocarpal joint) and fingers   __x__ Active  __x__ Active assist  __x__ Passive- fingers  ____ Resistive/Strengthening      Auth Tracker  Auth Status Total   Visits  Expiration date Co-Insurance   Approved 24    Approved 12 10/26/24                           Visit Tracker: AUTH REQ. BOMN. Copay: $25  Date   IE 7/3 7/8 7/10 7/15 7/19    7/22 7/24 7/26 7/29  RE 7/31 8/2  X    8/5 8/7 8/12 8/14 8/19 8/26       RE  X                                                      PMHx:   has a past medical history of Anxiety and Hypertension.         Manuals HEP 2024   STM/edema management/retrograde massage  x5min                  Ther Ex     Education on HEP and dx  x5min   Therapist assisted  AAROM of wrist/hand and gentle PASSIVE fingers  x10min   Prayer stretch x 10 sec hold x10   AROM hook fist x x10   AROM wrist flex/ext/RD/UD x 2x10   AROM forearm rotation x x10   Towel scrunches  x5   Juxaciser  x10   Functional dexterity Dice, IHM, opposition      Wrist AROM over table with green ball  x20   Wrist & forearm AROM w/ flexbar  3x10   Wrist & FA AAROM with ball  3x10   Power web digit flexion  2x10  Green    Digit extension rubberband, thumb radial/palmar abd  2x10  red   Digit flex  2x10  red   Dice   Green/Red 3pt pinch   Theraputty gentle gripping & pinching     Nuts and bolts board          Modalities     MHP   x5min          Assessment:   Patient tolerated session well. Session focused on HEP education, retrograde, massage, edema management, wound care management, range of motion, gentle strengthening of digits and patient education  to improve functional task performance with daily activities. Patient tolerated all MT and TE with good tolerance and minimal complaints of pain requiring rest breaks as needed. Patient demonstrates improvement in ROM of digits as evidence by ability to touch DPC of the middle, ring, and small digits and approximately 0.5 to 1.0 cm away from DPC of the index digit.  Patient progressing well towards goals. Patient would benefit from continued skilled hand therapy OT to reduce deficits to improve independence with daily activities.        Plan:   Focus on AROM, AAROM, PROM of fingers, custom orthotic splint, train and fit orthotics, edema management, scar massage, pin care hygiene management, and all modalities as seen fit to improve ability to complete daily activites with ease.    POC 7/29/2024 - 9/23/2024

## 2024-08-28 ENCOUNTER — OFFICE VISIT (OUTPATIENT)
Dept: OCCUPATIONAL THERAPY | Facility: CLINIC | Age: 64
End: 2024-08-28
Payer: COMMERCIAL

## 2024-08-28 DIAGNOSIS — S63.055A CMC (CARPOMETACARPAL JOINT) DISLOCATION, LEFT, INITIAL ENCOUNTER: Primary | ICD-10-CM

## 2024-08-28 DIAGNOSIS — Z98.890 POSTOPERATIVE STATE: ICD-10-CM

## 2024-08-28 DIAGNOSIS — Z98.890 POST-OPERATIVE STATE: ICD-10-CM

## 2024-08-28 PROCEDURE — 97140 MANUAL THERAPY 1/> REGIONS: CPT

## 2024-08-28 PROCEDURE — 97110 THERAPEUTIC EXERCISES: CPT

## 2024-08-28 NOTE — PROGRESS NOTES
Daily Note     Today's date: 2024  Patient name: Misha Gonzalez  : 1960  MRN: [id number]  Referring provider: Ngoc Campos,*  Dx:   Encounter Diagnosis     ICD-10-CM    1. CMC (carpometacarpal joint) dislocation, left, initial encounter  S63.055A       2. Post-operative state  Z98.890       3. Postoperative state  Z98.890           Start Time: 0800  Stop Time: 0900  Total time in clinic (min): 60 minutes    Subjective: Patient has f/u with Dr. Campos on Tuesday, 9/3/2024.         Objective: See treatment diary below.    Current Measurements as of 2024 - compared to last RE on 24  Wrist Flexion: 50 degrees (+5)  Wrist Extension: 60 degrees (+10)  Radial Dev: 12 degrees (-3)  Ulnar Dev: 15 degrees (+5)    Pronation: 83 degrees (+13)  Supination: 72 degrees (+2)      Digit ROM:  3.0CM DPC Index only  Loose composite to DPC remaining digits           Precautions:   11 weeks as of 2024  Tubigrip Size F   S/P Debridement Hand/finger (wash Out) - Left, Open Reduction W/ Internal Fixation (orif) Hand - Left, and Debridement Wound (wash Out) - Left on 6/15/2024      Modalities per therapist's discretion     ROM:  Elbow, forearm, wrist (radiocarpal joint) and fingers   __x__ Active  __x__ Active assist  __x__ Passive- fingers  ____ Resistive/Strengthening      Auth Tracker  Auth Status Total   Visits  Expiration date Co-Insurance   Approved 12 24    Approved 12 10/26/24                           Visit Tracker: AUTH REQ. BOMN. Copay: $25  Date   IE 7/3 7/8 7/10 7/15 7/19    7/22 7/24 7/26 7/29  RE 7/31 8/2  X    8/5 8/7 8/12 8/14 8/19 8/26    8/28   RE  X                                                      PMHx:   has a past medical history of Anxiety and Hypertension.         Manuals HEP 2024   STM/edema management/retrograde massage/IASTM scar tissue management  x8min                  Ther Ex     Education on HEP and dx  x5min   Therapist assisted SUSANA of wrist/hand  and gentle PASSIVE fingers  x10min   Prayer stretch x 10 sec hold x10   AROM hook fist x x10   AROM wrist flex/ext/RD/UD x 2x10   AROM forearm rotation x x10   Towel scrunches  x5   Juxaciser  x10   Functional dexterity Dice, IHM, opposition      Wrist AROM over table with green ball  x20   Wrist & forearm AROM w/ flexbar  3x10    Wrist & FA AAROM with ball  3x10   Power web digit flexion  2x10  Green    Digit extension rubberband, thumb radial/palmar abd     Digit flex     Dice   Yellow 3 pt pinch    Nuts and bolts board  x5min        Modalities     MHP   x5min          Assessment:   Patient tolerated session well. Session focused on HEP education, retrograde, massage, edema management, wound care management, range of motion, gentle strengthening of digits and patient education  to improve functional task performance with daily activities. Patient tolerated all MT and TE with good tolerance and minimal complaints of fatigue at end of session. Patient demonstrates improvement in ROM of wrist, forearm and digits as evidence by today's objective measurements.  Patient progressing well towards goals. Patient would benefit from continued skilled hand therapy OT to reduce deficits to improve independence with daily activities.        Plan:   Focus on AROM, AAROM, PROM of fingers, custom orthotic splint, train and fit orthotics, edema management, scar massage, pin care hygiene management, UE strengthening when appropriate and all modalities as seen fit to improve ability to complete daily activites with ease.    POC 7/29/2024 - 9/23/2024

## 2024-09-03 ENCOUNTER — OFFICE VISIT (OUTPATIENT)
Dept: OBGYN CLINIC | Facility: CLINIC | Age: 64
End: 2024-09-03

## 2024-09-03 ENCOUNTER — APPOINTMENT (OUTPATIENT)
Dept: RADIOLOGY | Facility: CLINIC | Age: 64
End: 2024-09-03
Payer: COMMERCIAL

## 2024-09-03 VITALS
DIASTOLIC BLOOD PRESSURE: 90 MMHG | WEIGHT: 194 LBS | HEIGHT: 69 IN | BODY MASS INDEX: 28.73 KG/M2 | SYSTOLIC BLOOD PRESSURE: 140 MMHG

## 2024-09-03 DIAGNOSIS — Z98.890 POSTOPERATIVE STATE: Primary | ICD-10-CM

## 2024-09-03 DIAGNOSIS — Z98.890 POSTOPERATIVE STATE: ICD-10-CM

## 2024-09-03 PROCEDURE — 73130 X-RAY EXAM OF HAND: CPT

## 2024-09-03 PROCEDURE — 99024 POSTOP FOLLOW-UP VISIT: CPT | Performed by: ORTHOPAEDIC SURGERY

## 2024-09-03 RX ORDER — BUPROPION HYDROCHLORIDE 300 MG/1
TABLET ORAL
COMMUNITY
Start: 2024-06-28

## 2024-09-03 RX ORDER — LISINOPRIL 20 MG/1
TABLET ORAL
COMMUNITY
Start: 2024-07-30

## 2024-09-03 NOTE — PROGRESS NOTES
"Assessment:   S/P Debridement Hand/finger (wash Out) - Left, Open Reduction W/ Internal Fixation (orif) Hand - Left, and Debridement Wound (wash Out) - Left on 6/15/2024    Plan:   Patient should continue therapy, scar tissue massage, and activity limitations to include no lifting, pushing, pulling, grasping, or gripping with the left hand. He continues to have limited ROM of his fingers. Encouraged to work on advanced ROM of fingers. New hand therapy script was given today. Patient stated today that he rode his motorcycle yesterday even though I advised the patient to avoid riding any motorcycle type activity, because he is not able to flexhis fingers to  and control such a vehicle.     Follow Up:  8  week(s)    To Do Next Visit:  Reevaluation of left hand.      CHIEF COMPLAINT:  Chief Complaint   Patient presents with    Left Hand - Post-op, Numbness     Left middle thumb numb         SUBJECTIVE:  Misha Gonzalez is a 63 y.o. male who presents for follow up after Debridement Hand/finger (wash Out) - Left, Open Reduction W/ Internal Fixation (orif) Hand - Left, and Debridement Wound (wash Out) - Left on 6/15/2024.  Misha presented today without his splint. He states he has been doing well and does hand therapy with good progression. He states it is still hard for him to make a full fist still.  He states today that he recently rode his motorcycle and felt comfortable riding it.       PHYSICAL EXAMINATION:  Vital signs: /90 (BP Location: Right arm, Patient Position: Sitting, Cuff Size: Adult)   Ht 5' 9\" (1.753 m) Comment: verbal  Wt 88 kg (194 lb)   BMI 28.65 kg/m²   General: well developed and well nourished, alert, oriented times 3, and appears comfortable  Psychiatric: Normal    MUSCULOSKELETAL EXAMINATION:  Incision:  Scars is remodeling on palm of hand  Residual edema present.  Range of Motion: As expected and Limited due to stiffness.  The wrist and finger ROM has improved.  AROM finger 3cm from DPFC. "  AAOM full composite flexion expect for the index finger.   Neurovascular status: Neuro intact, good cap refill  Activity Restrictions: Restrictions: no lifting, pushing, pulling, gripping, or grasping with left hand      STUDIES REVIEWED:  X-rays of left hand obtained and reviewed. No lucencies.  CMC joints remain in reduced, anatomic position.    PROCEDURES PERFORMED:  Procedures  none    Scribe Attestation      I,:  Philippe Oliver am acting as a scribe while in the presence of the attending physician.:       I,:  Ngoc Campos MD personally performed the services described in this documentation    as scribed in my presence.:

## 2024-09-04 ENCOUNTER — OFFICE VISIT (OUTPATIENT)
Dept: OCCUPATIONAL THERAPY | Facility: CLINIC | Age: 64
End: 2024-09-04
Payer: COMMERCIAL

## 2024-09-04 DIAGNOSIS — S63.055A CMC (CARPOMETACARPAL JOINT) DISLOCATION, LEFT, INITIAL ENCOUNTER: Primary | ICD-10-CM

## 2024-09-04 DIAGNOSIS — Z98.890 POSTOPERATIVE STATE: ICD-10-CM

## 2024-09-04 DIAGNOSIS — Z98.890 POST-OPERATIVE STATE: ICD-10-CM

## 2024-09-04 PROCEDURE — 97110 THERAPEUTIC EXERCISES: CPT

## 2024-09-04 PROCEDURE — 97140 MANUAL THERAPY 1/> REGIONS: CPT

## 2024-09-04 NOTE — PROGRESS NOTES
Daily Note     Today's date: 2024  Patient name: Misha Gonzalez  : 1960  MRN: [id number]  Referring provider: Ngoc Campos,*  Dx:   Encounter Diagnosis     ICD-10-CM    1. CMC (carpometacarpal joint) dislocation, left, initial encounter  S63.055A       2. Post-operative state  Z98.890       3. Postoperative state  Z98.890           Start Time: 1015  Stop Time: 1100  Total time in clinic (min): 45 minutes    Subjective: Patient offers no functional changes this date.               Objective: See treatment diary below.               Precautions:   11 weeks as of 2024  Evaluate and Treat     OT/CHT     Evaluate and Treat  Frequency per therapist's discretion  Duration 4-6 weeks or as indicated per protocol        Splinting per therapist's discretion   Desensitization  Edema management        Modalities per therapist's discretion     ROM:  Elbow, forearm, wrist and fingers   __x__ Active  __x__ Active assist  __x__ Passive  __x__ Resistive/Strengthening     Please contact Dr. Campos with questions/concerns      Auth Tracker  Auth Status Total   Visits  Expiration date Co-Insurance   Approved 24    Approved 12 10/26/24                           Visit Tracker: AUTH REQ. BOMN. Copay: $25  Date   IE 7/3 7/8 7/10 7/15 7/19    7/22 7/24 7/26 7/29  RE  8/2  X    8/5 8/7 8/12 8/14 8/19 8/26    8/28 9/4  RE  X                                                      PMHx:   has a past medical history of Anxiety and Hypertension.         Manuals HEP 2024   STM/edema management/retrograde massage/IASTM scar tissue management  x8min                  Ther Ex     Education on HEP and dx  x5min   Therapist assisted AAROM of wrist/hand and gentle PASSIVE fingers  x10min   Prayer stretch x 10 sec hold x10   AROM hook fist x x10   AROM wrist flex/ext/RD/UD x 2x10   AROM forearm rotation x x10   Towel scrunches  x10   Juxaciser  x10   Wrist AROM over table with green ball  x20   Wrist &  forearm AROM w/ flexbar  3x10    Wrist & FA AAROM with ball  3x10   Power web digit flexion  2x10  Green    Digit extension rubberband, thumb radial/palmar abd     Digit flex  Green 2x10   Dice   Red 3 pt pinch    Nuts and bolts board          Modalities     MHP   x5min          Assessment:   Patient tolerated session well. Session focused on HEP education, retrograde, massage, edema management, wound care management, range of motion, gentle strengthening of digits and patient education  to improve functional task performance with daily activities. Patient tolerated all MT and TE with good tolerance and minimal complaints of fatigue at end of session. Patient would benefit from continued skilled hand therapy OT to reduce deficits to improve independence with daily activities.        Plan:   Focus on AROM, AAROM, PROM of fingers, custom orthotic splint, train and fit orthotics, edema management, scar massage, pin care hygiene management, UE strengthening when appropriate and all modalities as seen fit to improve ability to complete daily activites with ease.    POC 7/29/2024 - 9/23/2024

## 2024-09-06 ENCOUNTER — OFFICE VISIT (OUTPATIENT)
Dept: OCCUPATIONAL THERAPY | Facility: CLINIC | Age: 64
End: 2024-09-06
Payer: COMMERCIAL

## 2024-09-06 DIAGNOSIS — Z98.890 POST-OPERATIVE STATE: ICD-10-CM

## 2024-09-06 DIAGNOSIS — Z98.890 POSTOPERATIVE STATE: ICD-10-CM

## 2024-09-06 DIAGNOSIS — S63.055A CMC (CARPOMETACARPAL JOINT) DISLOCATION, LEFT, INITIAL ENCOUNTER: Primary | ICD-10-CM

## 2024-09-06 PROCEDURE — 97110 THERAPEUTIC EXERCISES: CPT

## 2024-09-06 NOTE — PROGRESS NOTES
Daily Note     Today's date: 2024  Patient name: Misha Gonzalez  : 1960  MRN: [id number]  Referring provider: Ngoc Campos,*  Dx:   Encounter Diagnosis     ICD-10-CM    1. CMC (carpometacarpal joint) dislocation, left, initial encounter  S63.055A       2. Post-operative state  Z98.890       3. Postoperative state  Z98.890           Start Time: 0800  Stop Time: 09  Total time in clinic (min): 60 minutes    Subjective: Patient offers no functional changes this date.               Objective: See treatment diary below.               Precautions:   11 weeks as of 2024  Evaluate and Treat     OT/CHT     Evaluate and Treat  Frequency per therapist's discretion  Duration 4-6 weeks or as indicated per protocol        Splinting per therapist's discretion   Desensitization  Edema management        Modalities per therapist's discretion     ROM:  Elbow, forearm, wrist and fingers   __x__ Active  __x__ Active assist  __x__ Passive  __x__ Resistive/Strengthening     Please contact Dr. Campos with questions/concerns      Auth Tracker  Auth Status Total   Visits  Expiration date Co-Insurance   Approved 24    Approved 12 10/26/24                           Visit Tracker: AUTH REQ. BOMN. Copay: $25  Date   IE 7/3 7/8 7/10 7/15 7/19    7/22 7/24 7/26 7/29  RE  8/2  X    8/5 8/7 8/12 8/14 8/19 8/26    8/28 9/4 9/6 RE  X                                                      PMHx:   has a past medical history of Anxiety and Hypertension.         Manuals HEP 2024             Ther Ex     Education on HEP and dx  x5min   Therapist assisted AAROM of wrist/hand and gentle PASSIVE fingers  x10min   Prayer stretch x 10 sec hold x10   AROM hook fist x x10   AROM wrist flex/ext/RD/UD x 2x10   AROM forearm rotation x x10   Towel scrunches  x10   Juxaciser  x10   Wrist AROM over table with ball  x20   Wrist & forearm AROM w/ flexbar  3x10    Wrist & FA AAROM with ball  3x10   Power web digit flexion   2x10  Green    Digit extension rubberband, thumb radial/palmar abd     Digit flex  Green 2x10   Dice   Red 3 pt pinch    Digit abd/add sponges  Red  x20        Modalities     MHP   x5min          Assessment:   Patient tolerated session well. Session focused on HEP education, retrograde, massage, edema management, wound care management, range of motion, gentle strengthening of digits and patient education  to improve functional task performance with daily activities. Patient tolerated all MT and TE with good tolerance and minimal complaints of fatigue at end of session. Patient would benefit from continued skilled hand therapy OT to reduce deficits to improve independence with daily activities.        Plan:   Focus on AROM, AAROM, PROM of fingers, custom orthotic splint, train and fit orthotics, edema management, scar massage, pin care hygiene management, UE strengthening when appropriate and all modalities as seen fit to improve ability to complete daily activites with ease.    POC 7/29/2024 - 9/23/2024

## 2024-09-09 ENCOUNTER — EVALUATION (OUTPATIENT)
Dept: OCCUPATIONAL THERAPY | Facility: CLINIC | Age: 64
End: 2024-09-09
Payer: COMMERCIAL

## 2024-09-09 DIAGNOSIS — S63.055A CMC (CARPOMETACARPAL JOINT) DISLOCATION, LEFT, INITIAL ENCOUNTER: Primary | ICD-10-CM

## 2024-09-09 DIAGNOSIS — Z98.890 POST-OPERATIVE STATE: ICD-10-CM

## 2024-09-09 DIAGNOSIS — Z98.890 POSTOPERATIVE STATE: ICD-10-CM

## 2024-09-09 PROCEDURE — 97110 THERAPEUTIC EXERCISES: CPT
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
## 2024-09-11 ENCOUNTER — APPOINTMENT (OUTPATIENT)
Dept: OCCUPATIONAL THERAPY | Facility: CLINIC | Age: 64
End: 2024-09-11
Payer: COMMERCIAL

## 2024-09-13 ENCOUNTER — APPOINTMENT (OUTPATIENT)
Dept: OCCUPATIONAL THERAPY | Facility: CLINIC | Age: 64
End: 2024-09-13
Payer: COMMERCIAL

## 2024-09-16 ENCOUNTER — OFFICE VISIT (OUTPATIENT)
Dept: OCCUPATIONAL THERAPY | Facility: CLINIC | Age: 64
End: 2024-09-16
Payer: COMMERCIAL

## 2024-09-16 DIAGNOSIS — S63.055A CMC (CARPOMETACARPAL JOINT) DISLOCATION, LEFT, INITIAL ENCOUNTER: Primary | ICD-10-CM

## 2024-09-16 DIAGNOSIS — Z98.890 POST-OPERATIVE STATE: ICD-10-CM

## 2024-09-16 DIAGNOSIS — Z98.890 POSTOPERATIVE STATE: ICD-10-CM

## 2024-09-16 PROCEDURE — 97110 THERAPEUTIC EXERCISES: CPT

## 2024-09-16 NOTE — PROGRESS NOTES
Daily Note     Today's date: 2024  Patient name: Misha Gonzalez  : 1960  MRN: [id number]  Referring provider: Ngoc Campos,*  Dx:   Encounter Diagnosis     ICD-10-CM    1. CMC (carpometacarpal joint) dislocation, left, initial encounter  S63.055A       2. Post-operative state  Z98.890       3. Postoperative state  Z98.890           Start Time: 0800  Stop Time: 0845  Total time in clinic (min): 45 minutes    Subjective:  Patient notes continued significant sensitivity to the radial side of the middle digit with pins and needles sensation.              Objective: See treatment diary below.               Precautions:   11 weeks as of 2024  Evaluate and Treat     OT/CHT     Evaluate and Treat  Frequency per therapist's discretion  Duration 4-6 weeks or as indicated per protocol        Splinting per therapist's discretion   Desensitization  Edema management        Modalities per therapist's discretion     ROM:  Elbow, forearm, wrist and fingers   __x__ Active  __x__ Active assist  __x__ Passive  __x__ Resistive/Strengthening     Please contact Dr. Campos with questions/concerns      Auth Tracker  Auth Status Total   Visits  Expiration date Co-Insurance   Approved 24    Approved 12 10/26/24                           Visit Tracker: AUTH REQ. BOMN. Copay: $25  Date   IE 7/3 7/8 7/10 7/15 7/19    7/22 7/24 7/26 7/29  RE  8/2  X    8/5 8/7 8/12 8/14 8/19 8/26    8/28 9  RE  X                                                      PMHx:   has a past medical history of Anxiety and Hypertension.         Manuals HEP 2024             Ther Ex     Education on HEP and dx  x5min   Therapist assisted AAROM of wrist/hand and gentle PASSIVE fingers     Prayer stretch x 10 sec hold x10   AROM hook fist x x10   AROM wrist flex/ext/RD/UD x 2x10   AROM forearm rotation x x10   Towel scrunches  x10   Juxaciser  x5   Flex bar digit roll out for digit extension   x10   Wrist AROM  over table with ball  x20   Wrist & forearm AROM w/ flexbar  3x10    Wrist & FA AAROM with ball  3x10   Power web digit flexion  2x10  Green    Digit extension rubberband, thumb radial/palmar abd  Red x10   Digit flex  Green 2x10   Dice   Green (5) /Red 3 pt pinch    Digit abd/add sponges  Red  x20   Thumb depressor   2x10 full resistance   Wrist PREs  1lb x10        Modalities     MHP   x5min          Assessment:   Patient tolerated session well.  Session focused on HEP education, range of motion, gentle strengthening of digits and patient education  to improve functional task performance with daily activities. Patient tolerated all MT and TE with good tolerance and minimal complaints of fatigue at end of session. Patient would benefit from continued skilled hand therapy OT to reduce deficits to improve independence with daily activities.              Plan:   Focus on AROM, AAROM, PROM of fingers, custom orthotic splint, train and fit orthotics, edema management, scar massage, pin care hygiene management, UE strengthening when appropriate and all modalities as seen fit to improve ability to complete daily activites with ease.    POC 9/9/2024 - 10/21/2024

## 2024-09-18 ENCOUNTER — OFFICE VISIT (OUTPATIENT)
Dept: OCCUPATIONAL THERAPY | Facility: CLINIC | Age: 64
End: 2024-09-18
Payer: COMMERCIAL

## 2024-09-18 DIAGNOSIS — S63.055A CMC (CARPOMETACARPAL JOINT) DISLOCATION, LEFT, INITIAL ENCOUNTER: Primary | ICD-10-CM

## 2024-09-18 DIAGNOSIS — Z98.890 POST-OPERATIVE STATE: ICD-10-CM

## 2024-09-18 DIAGNOSIS — Z98.890 POSTOPERATIVE STATE: ICD-10-CM

## 2024-09-18 PROCEDURE — 97110 THERAPEUTIC EXERCISES: CPT

## 2024-09-18 NOTE — PROGRESS NOTES
Daily Note     Today's date: 2024  Patient name: Misha Gonzalez  : 1960  MRN: [id number]  Referring provider: Ngoc Campos,*  Dx:   Encounter Diagnosis     ICD-10-CM    1. CMC (carpometacarpal joint) dislocation, left, initial encounter  S63.055A       2. Post-operative state  Z98.890       3. Postoperative state  Z98.890           Start Time: 0845  Stop Time: 930  Total time in clinic (min): 45 minutes    Subjective:  Patient offers no functional changes this date.               Objective: See treatment diary below.               Precautions:   11 weeks as of 2024  Evaluate and Treat     OT/CHT     Evaluate and Treat  Frequency per therapist's discretion  Duration 4-6 weeks or as indicated per protocol        Splinting per therapist's discretion   Desensitization  Edema management        Modalities per therapist's discretion     ROM:  Elbow, forearm, wrist and fingers   __x__ Active  __x__ Active assist  __x__ Passive  __x__ Resistive/Strengthening     Please contact Dr. Campos with questions/concerns      Auth Tracker  Auth Status Total   Visits  Expiration date Co-Insurance   Approved 24    Approved 12 10/26/24                           Visit Tracker: AUTH REQ. BOMN. Copay: $25  Date   IE 7/3 7/8 7/10 7/15 7/19    7/22 7/24 7/26 7/29  RE  8/2  X    8/5 8/7 8/12 8/14 8/19 8/26    8/28 9/4 9/6 9/9  RE   X                                                      PMHx:   has a past medical history of Anxiety and Hypertension.         Manuals HEP 2024             Ther Ex     Education on HEP and dx  x5min   Therapist assisted AAROM of wrist/hand and gentle PASSIVE fingers  x8min   Prayer stretch x 10 sec hold x10   AROM hook fist x x10   AROM wrist flex/ext/RD/UD x 2x10   AROM forearm rotation x x10   Towel scrunches  x10   Juxaciser  x5   Flex bar digit roll out for digit extension   x20   Wrist AROM with ball   x20 red weighted ball   Wrist & forearm AROM w/  flexbar  3x10    Wrist & FA AAROM with ball     Power web digit flexion  x10  Green    Digit extension rubberband, thumb radial/palmar abd  Red x10   Digit flex  Green x10   Dice   Green (8) /Red 3 pt pinch    Digit abd/add sponges     Thumb depressor   2x10 full resistance   Wrist PREs  1lb x10   Forearm rotation pronator bar  1lb x10        Modalities     MHP   x5min          Assessment:   Patient tolerated session well.  Session focused on HEP education, range of motion, strengthening of UE & digits and patient education  to improve functional task performance with daily activities. Patient tolerated all TE with good tolerance and minimal complaints of fatigue at end of session. Patient would benefit from continued skilled hand therapy OT to reduce deficits to improve independence with daily activities.              Plan:   Focus on AROM, AAROM, PROM of fingers, custom orthotic splint, train and fit orthotics, edema management, scar massage, pin care hygiene management, UE strengthening when appropriate and all modalities as seen fit to improve ability to complete daily activites with ease.    POC 9/9/2024 - 10/21/2024

## 2024-09-20 ENCOUNTER — APPOINTMENT (OUTPATIENT)
Dept: OCCUPATIONAL THERAPY | Facility: CLINIC | Age: 64
End: 2024-09-20
Payer: COMMERCIAL

## 2024-09-23 ENCOUNTER — APPOINTMENT (OUTPATIENT)
Dept: OCCUPATIONAL THERAPY | Facility: CLINIC | Age: 64
End: 2024-09-23
Payer: COMMERCIAL

## 2024-09-25 ENCOUNTER — OFFICE VISIT (OUTPATIENT)
Dept: OCCUPATIONAL THERAPY | Facility: CLINIC | Age: 64
End: 2024-09-25
Payer: COMMERCIAL

## 2024-09-25 DIAGNOSIS — Z98.890 POST-OPERATIVE STATE: ICD-10-CM

## 2024-09-25 DIAGNOSIS — S63.055A CMC (CARPOMETACARPAL JOINT) DISLOCATION, LEFT, INITIAL ENCOUNTER: Primary | ICD-10-CM

## 2024-09-25 DIAGNOSIS — Z98.890 POSTOPERATIVE STATE: ICD-10-CM

## 2024-09-25 PROCEDURE — 97110 THERAPEUTIC EXERCISES: CPT

## 2024-09-25 NOTE — PROGRESS NOTES
"      Daily Note     Today's date: 2024  Patient name: Misha Gonzalez  : 1960  MRN: [id number]  Referring provider: Ngoc Campos,*  Dx:   Encounter Diagnosis     ICD-10-CM    1. CMC (carpometacarpal joint) dislocation, left, initial encounter  S63.055A       2. Post-operative state  Z98.890       3. Postoperative state  Z98.890           Start Time: 0800  Stop Time: 0845  Total time in clinic (min): 45 minutes    Subjective: Patient reports \"it feels good, but the middle finger feels pins and needle sensation but touching it feels like an electric sock\"; \"the thumb and index feel normal\".                 Objective: See treatment diary below.               Precautions:   15 weeks as of 2024  Evaluate and Treat     OT/CHT     Evaluate and Treat  Frequency per therapist's discretion  Duration 4-6 weeks or as indicated per protocol        Splinting per therapist's discretion   Desensitization  Edema management        Modalities per therapist's discretion     ROM:  Elbow, forearm, wrist and fingers   __x__ Active  __x__ Active assist  __x__ Passive  __x__ Resistive/Strengthening     Please contact Dr. Campos with questions/concerns      Auth Tracker  Auth Status Total   Visits  Expiration date Co-Insurance   Approved 24    Approved 12 10/26/24     Approved 12 12/10/24                    Visit Tracker: AUTH REQ. BOMN. Copay: $25  Date   IE 7/3 7/8 7/10 7/15 7/19    7/22 7/24 7/26 7/29  RE  8/2  X    8/5 8/7 8/12 8/14 8/19 8/26    8/28 9  RE 9/16 9/18  X    9/25              X                                    PMHx:   has a past medical history of Anxiety and Hypertension.         Manuals HEP 2024             Ther Ex     Education on HEP and dx  x5min   Therapist assisted AAROM of wrist/hand and PASSIVE fingers  x8min   Prayer stretch x 10 sec hold x10   AROM hook fist x x10   AROM wrist flex/ext/RD/UD x 2x10   AROM forearm rotation x x10   Juxaciser  x5   Flex bar " digit roll out for digit extension   x20   Wrist AROM with ball   x20 red weighted ball   Wrist & forearm AROM w/ flexbar  3x10    Wrist & FA AAROM with ball     Power web digit flexion  2x10  Green    Digit extension rubberband, thumb radial/palmar abd  Red x10   Digit flex  Red 2 x10   Dice   Green 3pt pinch    Digit abd/add sponges     Thumb depressor   2x10 full resistance   Wrist PREs  2lb x10   Forearm rotation pronator bar  1lb x10        Modalities     MHP   x5min          Assessment:   Patient tolerated session well.  Session focused on HEP education, range of motion, strengthening of UE & digits and patient education  to improve functional task performance with daily activities. Patient tolerated all TE with good tolerance and minimal complaints of fatigue at end of session. Patient would benefit from continued skilled hand therapy OT to reduce deficits to improve independence with daily activities.              Plan:   Focus on AROM, AAROM, PROM of fingers, custom orthotic splint, train and fit orthotics, edema management, scar massage, pin care hygiene management, UE strengthening when appropriate and all modalities as seen fit to improve ability to complete daily activites with ease.    POC 9/9/2024 - 10/21/2024

## 2024-09-27 ENCOUNTER — APPOINTMENT (OUTPATIENT)
Dept: OCCUPATIONAL THERAPY | Facility: CLINIC | Age: 64
End: 2024-09-27
Payer: COMMERCIAL

## 2024-09-30 ENCOUNTER — OFFICE VISIT (OUTPATIENT)
Dept: OCCUPATIONAL THERAPY | Facility: CLINIC | Age: 64
End: 2024-09-30
Payer: COMMERCIAL

## 2024-09-30 DIAGNOSIS — Z98.890 POSTOPERATIVE STATE: ICD-10-CM

## 2024-09-30 DIAGNOSIS — Z98.890 POST-OPERATIVE STATE: ICD-10-CM

## 2024-09-30 DIAGNOSIS — S63.055A CMC (CARPOMETACARPAL JOINT) DISLOCATION, LEFT, INITIAL ENCOUNTER: Primary | ICD-10-CM

## 2024-09-30 PROCEDURE — 97110 THERAPEUTIC EXERCISES: CPT

## 2024-09-30 NOTE — PROGRESS NOTES
Daily Note     Today's date: 2024  Patient name: Misha Gonzalez  : 1960  MRN: [id number]  Referring provider: Ngoc Campos,*  Dx:   Encounter Diagnosis     ICD-10-CM    1. CMC (carpometacarpal joint) dislocation, left, initial encounter  S63.055A       2. Post-operative state  Z98.890       3. Postoperative state  Z98.890           Start Time: 0800  Stop Time: 0845  Total time in clinic (min): 45 minutes    Subjective: Patient reports no subjective functional changes this date.                Objective: See treatment diary below.      + Full loose composite fist LUE         Precautions:   15 weeks as of 2024  Evaluate and Treat     OT/CHT     Evaluate and Treat  Frequency per therapist's discretion  Duration 4-6 weeks or as indicated per protocol        Splinting per therapist's discretion   Desensitization  Edema management        Modalities per therapist's discretion     ROM:  Elbow, forearm, wrist and fingers   __x__ Active  __x__ Active assist  __x__ Passive  __x__ Resistive/Strengthening     Please contact Dr. Campos with questions/concerns      Auth Tracker  Auth Status Total   Visits  Expiration date Co-Insurance   Approved 24    Approved 12 10/26/24     Approved 12 12/10/24                    Visit Tracker: AUTH REQ. BOMN. Copay: $25  Date   IE 7/3 7/8 7/10 7/15 7/19    7/22 7/24 7/26 7/29  RE  8/2  X    8 9 9  RE 9/16 9/18  X    9/25 9/30             X                                    PMHx:   has a past medical history of Anxiety and Hypertension.         Manuals HEP 2024             Ther Ex     Education on HEP and dx  x5min   Therapist assisted AAROM of wrist/hand and PASSIVE fingers  x8min   Prayer stretch x 10 sec hold x10   AROM hook fist x x10   AROM wrist flex/ext/RD/UD x 2x10   AROM forearm rotation x x10   Juxaciser  x5   Flexbar all planes  X10 green   Power web digit flexion     Digit extension  rubberband, thumb radial/palmar abd  Red x10   Digit flex  Green 2 x10   Dice   Green 3pt pinch    Thumb depressor   2x10 full resistance   Wrist PREs  2lb x10 2 sets   Forearm rotation pronator bar  1lb x10   Bicep curls  3 sets x10 3lbs        Modalities     MHP   x5min          Assessment:   Patient tolerated session well.  Session focused on HEP education, range of motion, strengthening of UE & digits and patient education  to improve functional task performance with daily activities. Patient tolerated all TE with good tolerance and minimal complaints of fatigue at end of session. Patient would benefit from continued skilled hand therapy OT to reduce deficits to improve independence with daily activities.              Plan:   Focus on AROM, AAROM, PROM of fingers, custom orthotic splint, train and fit orthotics, edema management, scar massage, pin care hygiene management, UE strengthening when appropriate and all modalities as seen fit to improve ability to complete daily activites with ease.    POC 9/9/2024 - 10/21/2024

## 2024-10-04 ENCOUNTER — OFFICE VISIT (OUTPATIENT)
Dept: OCCUPATIONAL THERAPY | Facility: CLINIC | Age: 64
End: 2024-10-04
Payer: COMMERCIAL

## 2024-10-04 DIAGNOSIS — Z98.890 POST-OPERATIVE STATE: ICD-10-CM

## 2024-10-04 DIAGNOSIS — Z98.890 POSTOPERATIVE STATE: ICD-10-CM

## 2024-10-04 DIAGNOSIS — S63.055A CMC (CARPOMETACARPAL JOINT) DISLOCATION, LEFT, INITIAL ENCOUNTER: Primary | ICD-10-CM

## 2024-10-04 PROCEDURE — 97110 THERAPEUTIC EXERCISES: CPT

## 2024-10-04 NOTE — PROGRESS NOTES
Daily Note     Today's date: 10/4/2024  Patient name: Misha Gonzalez  : 1960  MRN: [id number]  Referring provider: Ngoc Campos,*  Dx:   Encounter Diagnosis     ICD-10-CM    1. CMC (carpometacarpal joint) dislocation, left, initial encounter  S63.055A       2. Post-operative state  Z98.890       3. Postoperative state  Z98.890           Start Time: 0800  Stop Time: 0845  Total time in clinic (min): 45 minutes    Subjective: Patient reports continued numbness to the middle digit at the beginning of today's session.                Objective: See treatment diary below.      + Full loose composite fist LUE         Precautions:   15 weeks as of 2024  Evaluate and Treat     OT/CHT     Evaluate and Treat  Frequency per therapist's discretion  Duration 4-6 weeks or as indicated per protocol        Splinting per therapist's discretion   Desensitization  Edema management        Modalities per therapist's discretion     ROM:  Elbow, forearm, wrist and fingers   __x__ Active  __x__ Active assist  __x__ Passive  __x__ Resistive/Strengthening     Please contact Dr. Campos with questions/concerns      Auth Tracker  Auth Status Total   Visits  Expiration date Co-Insurance   Approved 24    Approved 12 10/26/24     Approved 12 12/10/24                    Visit Tracker: AUTH REQ. BOMN. Copay: $25  Date   IE 7/3 7/8 7/10 7/15 7/19    7/22 7/24 7/26 7/29  RE  8/2  X    8  RE 9/16 9/18  X    9/25 9/30 10/4            X                                    PMHx:   has a past medical history of Anxiety and Hypertension.         Manuals HEP 10/4/2024             Ther Ex     Education on HEP and dx  x5min   Therapist assisted AAROM of wrist/hand and PASSIVE fingers  x5min   Prayer stretch x 10 sec hold x10   AROM hook fist x x10   AROM wrist flex/ext/RD/UD x 2x10   AROM forearm rotation x x10   Juxaciser  x10   Flexbar all planes  X10 green   Power web  wrist flexion  Black 10 sec hold x5   Digit extension rubberband, thumb radial/palmar abd     Digit flex     Dice   Green 3pt pinch/reverse    Thumb depressor   x10 full resistance   Wrist PREs  2lb x10 2 sets   Forearm rotation pronator bar  2lbs x10   Bicep curls  3 sets x10 3lbs   Wrist WB on plyth  X10    Gripper sponge transfer  Level 2                  Modalities     MHP   x5min          Assessment:   Patient tolerated session well.  Session focused on HEP education, range of motion, strengthening of UE & digits and patient education  to improve functional task performance with daily activities. Patient tolerated all TE with good tolerance and minimal complaints of fatigue at end of session. Patient would benefit from continued skilled hand therapy OT to reduce deficits to improve independence with daily activities.              Plan:   Focus on AROM, AAROM, PROM of fingers, custom orthotic splint, train and fit orthotics, edema management, scar massage, pin care hygiene management, UE strengthening when appropriate and all modalities as seen fit to improve ability to complete daily activites with ease.    POC 9/9/2024 - 10/21/2024

## 2024-10-07 ENCOUNTER — OFFICE VISIT (OUTPATIENT)
Dept: OCCUPATIONAL THERAPY | Facility: CLINIC | Age: 64
End: 2024-10-07
Payer: COMMERCIAL

## 2024-10-07 DIAGNOSIS — Z98.890 POSTOPERATIVE STATE: ICD-10-CM

## 2024-10-07 DIAGNOSIS — S63.055A CMC (CARPOMETACARPAL JOINT) DISLOCATION, LEFT, INITIAL ENCOUNTER: Primary | ICD-10-CM

## 2024-10-07 DIAGNOSIS — Z98.890 POST-OPERATIVE STATE: ICD-10-CM

## 2024-10-07 PROCEDURE — 97110 THERAPEUTIC EXERCISES: CPT

## 2024-10-07 NOTE — PROGRESS NOTES
Daily Note     Today's date: 10/7/2024  Patient name: Misha Gonzalez  : 1960  MRN: [id number]  Referring provider: Ngoc Campos,*  Dx:   Encounter Diagnosis     ICD-10-CM    1. CMC (carpometacarpal joint) dislocation, left, initial encounter  S63.055A       2. Post-operative state  Z98.890       3. Postoperative state  Z98.890           Start Time: 0800  Stop Time: 0845  Total time in clinic (min): 45 minutes    Subjective: Patient offers no functional changes from last session.                 Objective: See treatment diary below.      + Full loose composite fist LUE         Precautions:   15 weeks as of 2024  Evaluate and Treat     OT/CHT     Evaluate and Treat  Frequency per therapist's discretion  Duration 4-6 weeks or as indicated per protocol        Splinting per therapist's discretion   Desensitization  Edema management        Modalities per therapist's discretion     ROM:  Elbow, forearm, wrist and fingers   __x__ Active  __x__ Active assist  __x__ Passive  __x__ Resistive/Strengthening     Please contact Dr. Campos with questions/concerns      Auth Tracker  Auth Status Total   Visits  Expiration date Co-Insurance   Approved 24    Approved 12 10/26/24     Approved 12 12/10/24                    Visit Tracker: AUTH REQ. BOMN. Copay: $25  Date   IE 7/3 7/8 7/10 7/15 7/19    7/22 7/24 7/26 7/29  RE  8/2  X    8/5 8/7 8/12 8/14 8/19 8/26    8/28 9/4 9/6 9/9  RE 9/16 9/18  X    9/25 9/30 10/4 10/7           X                                    PMHx:   has a past medical history of Anxiety and Hypertension.         Manuals HEP 10/7/2024             Ther Ex     Education on HEP and dx  x5min   Therapist assisted AAROM of wrist/hand and PASSIVE fingers  x5min   Prayer stretch x 10 sec hold x10   AROM hook fist x x10   AROM wrist flex/ext/RD/UD x 2x10   AROM forearm rotation x x10   Juxaciser  x10   Flexbar all planes  X10 green   Power web wrist flexion     Digit extension  rubberband, thumb radial/palmar abd     Digit flex     Dice   Green 3pt pinch/reverse    Thumb depressor   x10 full resistance   Wrist PREs  3lb x10 2 sets   Forearm rotation pronator bar  2lbs x10   Bicep curls  3 sets x10 4lbs   Wrist WB on scale  10 sec hold x10 30lbs   Gripper sponge transfer  Level 2                  Modalities     MHP   x5min          Assessment:   Patient tolerated session well.  Session focused on HEP education, range of motion, strengthening of UE wrist & digits and patient education  to improve functional task performance with daily activities. Patient tolerated all TE with good tolerance and minimal complaints of fatigue at end of session. Patient would benefit from continued skilled hand therapy OT to reduce deficits to improve independence with daily activities.              Plan:   Focus on AROM, AAROM, PROM of fingers, custom orthotic splint, train and fit orthotics, edema management, scar massage, pin care hygiene management, UE strengthening when appropriate and all modalities as seen fit to improve ability to complete daily activites with ease.    POC 9/9/2024 - 10/21/2024

## 2024-10-09 ENCOUNTER — OFFICE VISIT (OUTPATIENT)
Dept: OCCUPATIONAL THERAPY | Facility: CLINIC | Age: 64
End: 2024-10-09
Payer: COMMERCIAL

## 2024-10-09 DIAGNOSIS — Z98.890 POST-OPERATIVE STATE: ICD-10-CM

## 2024-10-09 DIAGNOSIS — Z98.890 POSTOPERATIVE STATE: ICD-10-CM

## 2024-10-09 DIAGNOSIS — S63.055A CMC (CARPOMETACARPAL JOINT) DISLOCATION, LEFT, INITIAL ENCOUNTER: Primary | ICD-10-CM

## 2024-10-09 PROCEDURE — 97110 THERAPEUTIC EXERCISES: CPT

## 2024-10-09 NOTE — PROGRESS NOTES
Daily Note     Today's date: 10/9/2024  Patient name: Misha Gonzalez  : 1960  MRN: [id number]  Referring provider: Ngoc Campos,*  Dx:   Encounter Diagnosis     ICD-10-CM    1. CMC (carpometacarpal joint) dislocation, left, initial encounter  S63.055A       2. Post-operative state  Z98.890       3. Postoperative state  Z98.890           Start Time: 0800  Stop Time: 0845  Total time in clinic (min): 45 minutes    Subjective: Patient offers no functional changes from last session.                 Objective: See treatment diary below.      + Full loose composite fist LUE         Precautions:   15 weeks as of 2024  Evaluate and Treat     OT/CHT     Evaluate and Treat  Frequency per therapist's discretion  Duration 4-6 weeks or as indicated per protocol        Splinting per therapist's discretion   Desensitization  Edema management        Modalities per therapist's discretion     ROM:  Elbow, forearm, wrist and fingers   __x__ Active  __x__ Active assist  __x__ Passive  __x__ Resistive/Strengthening     Please contact Dr. Campos with questions/concerns      Auth Tracker  Auth Status Total   Visits  Expiration date Co-Insurance   Approved 24    Approved 12 10/26/24     Approved 12 12/10/24                    Visit Tracker: AUTH REQ. BOMN. Copay: $25  Date   IE 7/3 7/8 7/10 7/15 7/19    7/22 7/24 7/26 7/29  RE  8/2  X    8/5 8/7 8/12 8/14 8/19 8/26    8/28 9/4 9/6 9/9  RE 9/16 9/18  X    9/25 9/30 10/4 10/7 10/9          X                                    PMHx:   has a past medical history of Anxiety and Hypertension.         Manuals HEP 10/9/2024             Ther Ex     Education on HEP and dx  x5min   Therapist assisted AAROM of wrist/hand and PASSIVE fingers  x5min   Prayer stretch x 10 sec hold x10   AROM hook fist x x10   AROM wrist flex/ext/RD/UD x 2x10   AROM forearm rotation x x10   Juxaciser  x10   Flexbar all planes  X10 green   Power web wrist flexion     Digit  extension rubberband, thumb radial/palmar abd     Digit flex     Dice   Red 3pt pinch/reverse    Thumb depressor   x10 full resistance   Wrist PREs  3lb x10 2 sets   Forearm rotation pronator bar  2lbs x10   Bicep curls  3 sets x10 4lbs   Wrist WB on scale  10 sec hold x10 30lbs   Gripper sponge transfer  Level 2                  Modalities     MHP   x5min          Assessment:   Patient tolerated session well.  Session focused on HEP education, range of motion, strengthening of UE wrist & digits and patient education  to improve functional task performance with daily activities. Patient tolerated all TE with good tolerance and minimal complaints of fatigue at the end of today's session. Patient would benefit from continued skilled hand therapy OT to reduce deficits to improve independence with daily activities.              Plan:   Focus on AROM, AAROM, PROM of fingers, custom orthotic splint, train and fit orthotics, edema management, scar massage, pin care hygiene management, UE strengthening when appropriate and all modalities as seen fit to improve ability to complete daily activites with ease.    POC 9/9/2024 - 10/21/2024

## 2024-10-14 ENCOUNTER — OFFICE VISIT (OUTPATIENT)
Dept: OCCUPATIONAL THERAPY | Facility: CLINIC | Age: 64
End: 2024-10-14
Payer: COMMERCIAL

## 2024-10-14 DIAGNOSIS — Z98.890 POSTOPERATIVE STATE: ICD-10-CM

## 2024-10-14 DIAGNOSIS — S63.055A CMC (CARPOMETACARPAL JOINT) DISLOCATION, LEFT, INITIAL ENCOUNTER: Primary | ICD-10-CM

## 2024-10-14 DIAGNOSIS — Z98.890 POST-OPERATIVE STATE: ICD-10-CM

## 2024-10-14 PROCEDURE — 97110 THERAPEUTIC EXERCISES: CPT

## 2024-10-14 NOTE — PROGRESS NOTES
Daily Note     Today's date: 10/14/2024  Patient name: Misha Gonzalez  : 1960  MRN: [id number]  Referring provider: Ngoc Campos,*  Dx:   Encounter Diagnosis     ICD-10-CM    1. CMC (carpometacarpal joint) dislocation, left, initial encounter  S63.055A       2. Post-operative state  Z98.890       3. Postoperative state  Z98.890           Start Time: 0800  Stop Time: 0845  Total time in clinic (min): 45 minutes    Subjective: Patient continues to report increased sensitivity to the middle digit tip.                 Objective: See treatment diary below.      + Full loose composite fist LUE         Precautions:   15 weeks as of 2024  Evaluate and Treat     OT/CHT     Evaluate and Treat  Frequency per therapist's discretion  Duration 4-6 weeks or as indicated per protocol        Splinting per therapist's discretion   Desensitization  Edema management        Modalities per therapist's discretion     ROM:  Elbow, forearm, wrist and fingers   __x__ Active  __x__ Active assist  __x__ Passive  __x__ Resistive/Strengthening     Please contact Dr. Campos with questions/concerns      Auth Tracker  Auth Status Total   Visits  Expiration date Co-Insurance   Approved 24    Approved 12 10/26/24     Approved 12 12/10/24                    Visit Tracker: AUTH REQ. BOMN. Copay: $25  Date   IE 7/3 7/8 7/10 7/15 7/19    7/22 7/24 7/26 7/29  RE 7/31 8/2  X    8/5 8/7 8/12 8/14 8/19 8/26    8/28 9/4 9/6 9/9  RE 9/16 9/18  X    9/25 9/30 10/4 10/7 10/9 10/14         X                                    PMHx:   has a past medical history of Anxiety and Hypertension.         Manuals HEP 10/14/2024             Ther Ex     Education on HEP and dx  x5min   Therapist assisted AAROM of wrist/hand and PASSIVE fingers  x5min   Prayer stretch x 10 sec hold x10   AROM hook fist x x10   AROM wrist flex/ext/RD/UD x 2x10   AROM forearm rotation x x10   Juxaciser  x10   Flexbar all planes  X20 green   Power web wrist  flexion     Digit extension rubberband, thumb radial/palmar abd     Dice   Green 3pt pinch   Thumb depressor   x10 full resistance   Wrist PREs  3lb x10 2 sets   Forearm rotation pronator bar  2lbs x10   Bicep curls  3 sets x10 5lbs   Wrist WB on scale  10 sec hold x10 30lbs   Gripper sponge transfer  Level 3                  Modalities     MHP   x5min          Assessment:   Patient tolerated session well.  Session focused on HEP education, range of motion, strengthening of UE wrist & digits and patient education  to improve functional task performance with daily activities. Initiated upgraded UE strengthening with good tolerance. Patient tolerated all TE with  no complaints. Patient would benefit from continued skilled hand therapy OT to reduce deficits to improve independence with daily activities.              Plan:   Focus on AROM, AAROM, PROM of fingers, custom orthotic splint, train and fit orthotics, edema management, scar massage, pin care hygiene management, UE strengthening when appropriate and all modalities as seen fit to improve ability to complete daily activites with ease.    POC 9/9/2024 - 10/21/2024

## 2024-10-16 ENCOUNTER — OFFICE VISIT (OUTPATIENT)
Dept: OCCUPATIONAL THERAPY | Facility: CLINIC | Age: 64
End: 2024-10-16
Payer: COMMERCIAL

## 2024-10-16 DIAGNOSIS — Z98.890 POSTOPERATIVE STATE: ICD-10-CM

## 2024-10-16 DIAGNOSIS — S63.055A CMC (CARPOMETACARPAL JOINT) DISLOCATION, LEFT, INITIAL ENCOUNTER: Primary | ICD-10-CM

## 2024-10-16 DIAGNOSIS — Z98.890 POST-OPERATIVE STATE: ICD-10-CM

## 2024-10-16 PROCEDURE — 97110 THERAPEUTIC EXERCISES: CPT

## 2024-10-16 NOTE — PROGRESS NOTES
Daily Note     Today's date: 10/16/2024  Patient name: Misha Gonzalez  : 1960  MRN: [id number]  Referring provider: Ngoc Campos,*  Dx:   Encounter Diagnosis     ICD-10-CM    1. CMC (carpometacarpal joint) dislocation, left, initial encounter  S63.055A       2. Post-operative state  Z98.890       3. Postoperative state  Z98.890           Start Time: 0800  Stop Time: 0845  Total time in clinic (min): 45 minutes    Subjective: Patient offers no new functional changes this date.                 Objective: See treatment diary below.             Precautions:   15 weeks as of 2024  Evaluate and Treat     OT/CHT     Evaluate and Treat  Frequency per therapist's discretion  Duration 4-6 weeks or as indicated per protocol        Splinting per therapist's discretion   Desensitization  Edema management        Modalities per therapist's discretion     ROM:  Elbow, forearm, wrist and fingers   __x__ Active  __x__ Active assist  __x__ Passive  __x__ Resistive/Strengthening     Please contact Dr. Campos with questions/concerns      Auth Tracker  Auth Status Total   Visits  Expiration date Co-Insurance   Approved 24    Approved 12 10/26/24     Approved 12 12/10/24                    Visit Tracker: AUTH REQ. BOMN. Copay: $25  Date   IE 7/3 7/8 7/10 7/15 7/19    7/22 7/24 7/26 7/29  RE  8/2  X    8/5 8/7 8/12 8/14 8/19 8/26    8/28 9/4 9/6 9/9  RE 9/16 9/18  X    9/25 9/30 10/4 10/7 10/9 10/14    10/16     X                                    PMHx:   has a past medical history of Anxiety and Hypertension.         Manuals HEP 10/16/2024             Ther Ex     Education on HEP and dx  x5min   Therapist assisted AAROM of wrist/hand and PASSIVE fingers  x5min   Prayer stretch x 10 sec hold x10   AROM hook fist x x10   AROM wrist flex/ext/RD/UD x 2x10   AROM forearm rotation x x10   Juxaciser  x10   Flexbar all planes  X20 green   Power web wrist flexion     Digit extension rubberband, thumb  radial/palmar abd     Dice   Green 3pt pinch   Thumb depressor   x10 full resistance   Wrist PREs  3lb x10 2 sets   Forearm rotation pronator bar  2lbs x10   Bicep curls  3 sets x10 5lbs   Wrist WB on scale  10 sec hold x10 30lbs - 35lbs   Gripper sponge transfer  Level 3                  Modalities     MHP   x5min          Assessment:   Patient tolerated session well.  Session focused on HEP education, range of motion, strengthening of UE wrist & digits and patient education  to improve functional task performance with daily activities. Patient tolerated all TE with  no complaints. Patient would benefit from continued skilled hand therapy OT to reduce deficits to improve independence with daily activities.              Plan:   Focus on AROM, AAROM, PROM of fingers, custom orthotic splint, train and fit orthotics, edema management, scar massage, pin care hygiene management, UE strengthening when appropriate and all modalities as seen fit to improve ability to complete daily activites with ease.    POC 9/9/2024 - 10/21/2024

## 2024-10-21 ENCOUNTER — EVALUATION (OUTPATIENT)
Dept: OCCUPATIONAL THERAPY | Facility: CLINIC | Age: 64
End: 2024-10-21
Payer: COMMERCIAL

## 2024-10-21 DIAGNOSIS — S63.055A CMC (CARPOMETACARPAL JOINT) DISLOCATION, LEFT, INITIAL ENCOUNTER: Primary | ICD-10-CM

## 2024-10-21 DIAGNOSIS — Z98.890 POST-OPERATIVE STATE: ICD-10-CM

## 2024-10-21 DIAGNOSIS — Z98.890 POSTOPERATIVE STATE: ICD-10-CM

## 2024-10-21 PROCEDURE — 97110 THERAPEUTIC EXERCISES: CPT
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
## 2024-10-28 ENCOUNTER — OFFICE VISIT (OUTPATIENT)
Dept: OCCUPATIONAL THERAPY | Facility: CLINIC | Age: 64
End: 2024-10-28
Payer: COMMERCIAL

## 2024-10-28 DIAGNOSIS — Z98.890 POSTOPERATIVE STATE: ICD-10-CM

## 2024-10-28 DIAGNOSIS — S63.055A CMC (CARPOMETACARPAL JOINT) DISLOCATION, LEFT, INITIAL ENCOUNTER: Primary | ICD-10-CM

## 2024-10-28 DIAGNOSIS — Z98.890 POST-OPERATIVE STATE: ICD-10-CM

## 2024-10-28 PROCEDURE — 97110 THERAPEUTIC EXERCISES: CPT

## 2024-10-28 NOTE — PROGRESS NOTES
Daily Note     Today's date: 10/28/2024  Patient name: Misha Gonzalez  : 1960  MRN: [id number]  Referring provider: Ngoc Campos,*  Dx:   Encounter Diagnosis     ICD-10-CM    1. CMC (carpometacarpal joint) dislocation, left, initial encounter  S63.055A       2. Post-operative state  Z98.890       3. Postoperative state  Z98.890           Start Time: 0800  Stop Time: 0845  Total time in clinic (min): 45 minutes    Subjective:   Patient reports continued sensitivity and numbness to the middle digit. Patient is scheduled for a f/u with Dr. Campos tomorrow 10/29/2024.           Objective: See treatment diary below.             Precautions:   18 weeks as of 10/19/2024  Evaluate and Treat     OT/CHT     Evaluate and Treat  Frequency per therapist's discretion  Duration 4-6 weeks or as indicated per protocol        Splinting per therapist's discretion   Desensitization  Edema management        Modalities per therapist's discretion     ROM:  Elbow, forearm, wrist and fingers   __x__ Active  __x__ Active assist  __x__ Passive  __x__ Resistive/Strengthening     Please contact Dr. Campos with questions/concerns      Auth Tracker  Auth Status Total   Visits  Expiration date Co-Insurance   Approved 24    Approved 12 10/26/24     Approved 12 12/10/24                    Visit Tracker: AUTH REQ. BOMN. Copay: $25  Date   IE 7/3 7/8 7/10 7/15 7/19    7/22 7/24 7/26 7/29  RE  8/2  X    8/5 8/7 8/12 8/14 8/19 8/26    8/28 9/4 9/6 9/9  RE 9/16 9/18  X    9/25 9/30 10/4 10/7 10/9 10/14    10/16 10/21  RE 10/28   X                                    PMHx:   has a past medical history of Anxiety and Hypertension.         Manuals HEP 10/28/2024             Ther Ex     Education on HEP and dx  x5min   Therapist assisted AAROM of wrist/hand and PASSIVE fingers     Prayer stretch x 10 sec hold x10   Wrist flexor/extensor stretch  [id number]   AROM hook fist x x10   AROM wrist flex/ext/RD/UD x 2x10    AROM forearm rotation x x10   Juxaciser  x10   Flexbar all planes  X20 green   Power web wrist flexion     Digit extension rubberband, thumb radial/palmar abd     Dice   Green 3pt pinch   Thumb depressor      Wrist PREs  4lbs x10   Forearm rotation pronator bar  Green x10   Bicep curls  3 sets x10 5lbs   Wrist WB on scale  38lbs [id number] 5 sec hold    Gripper sponge transfer  Level 3                  Modalities     MHP   x5min          Assessment:   Patient tolerated session well.  Session focused on HEP education, range of motion, strengthening of UE wrist & digits and patient education  to improve functional task performance with daily activities. Patient tolerated all TE with no complaints. Patient would benefit from continued skilled hand therapy OT to reduce deficits to improve independence with daily activities.              Plan:   Focus on AROM, AAROM, PROM of fingers, custom orthotic splint, train and fit orthotics, edema management, scar massage, pin care hygiene management, UE strengthening when appropriate and all modalities as seen fit to improve ability to complete daily activites with ease.    POC 10/21/2024 - 12/16/2024

## 2024-10-29 VITALS
DIASTOLIC BLOOD PRESSURE: 60 MMHG | BODY MASS INDEX: 28.29 KG/M2 | WEIGHT: 191 LBS | HEIGHT: 69 IN | SYSTOLIC BLOOD PRESSURE: 130 MMHG

## 2024-10-29 DIAGNOSIS — S63.055A CMC (CARPOMETACARPAL JOINT) DISLOCATION, LEFT, INITIAL ENCOUNTER: ICD-10-CM

## 2024-10-29 DIAGNOSIS — Z98.890 POSTOPERATIVE STATE: Primary | ICD-10-CM

## 2024-10-29 PROCEDURE — 99213 OFFICE O/P EST LOW 20 MIN: CPT | Performed by: ORTHOPAEDIC SURGERY

## 2024-10-29 RX ORDER — AMLODIPINE BESYLATE 5 MG/1
TABLET ORAL
COMMUNITY
Start: 2024-07-30

## 2024-10-29 RX ORDER — TRAZODONE HYDROCHLORIDE 50 MG/1
50 TABLET, FILM COATED ORAL
COMMUNITY
Start: 2024-09-16

## 2024-10-29 NOTE — PROGRESS NOTES
Assessment/Plan:  1. Postoperative state        2. CMC (carpometacarpal joint) dislocation, left, initial encounter            The patient has done very well.  He has regained full function of the hand. Date of surgery was 6/15/24)  The patient was given an opportunity to ask questions.  Questions were answered to the patient's satisfaction.    At this time, he can c/w his therapy exercises (has 2 more visits) and proceed with activities as tolerated.   Pt urged to continue to work on his PIP joint extensor lag as this is pliable on exam today.   No further follow up required.   Call if any questions/concerns.            cc: s/p left hand surgery    Subjective:   Misha Gonzalez is a 63 y.o. male who presents for follow up s/p left hand debridement and ORIF on 6/15/24. Pt states he has continued to do therapy and feels like his hand is doing very well. He is able to make a fist now. He has 2 therapy visits remaining.        Review of Systems   All other systems reviewed and are negative.        Past Medical History:   Diagnosis Date    Anxiety     Hypertension        Past Surgical History:   Procedure Laterality Date    EYE SURGERY      HAND DEBRIDEMENT Left 6/15/2024    Procedure: DEBRIDEMENT HAND/FINGER (WASH OUT);  Surgeon: Ngoc Campos MD;  Location: [facility];  Service: Orthopedics    HAND FRACTURE REPAIR Left 6/15/2024    Procedure: OPEN REDUCTION W/ INTERNAL FIXATION (ORIF) HAND;  Surgeon: Ngoc Campos MD;  Location: [facility];  Service: Orthopedics    JOINT REPLACEMENT      WOUND DEBRIDEMENT Left 6/15/2024    Procedure: DEBRIDEMENT WOUND (WASH OUT);  Surgeon: Ngoc Campos MD;  Location: [facility];  Service: Orthopedics       History reviewed. No pertinent family history.    Social History     Occupational History    Not on file   Tobacco Use    Smoking status: Never    Smokeless tobacco: Never   Vaping Use    Vaping status: Never Used   Substance and Sexual Activity    Alcohol  use: Yes     Comment: socially    Drug use: Not Currently    Sexual activity: Not on file         Current Outpatient Medications:     amLODIPine (NORVASC) 5 mg tablet, , Disp: , Rfl:     traZODone (DESYREL) 50 mg tablet, Take 50 mg by mouth daily at bedtime, Disp: , Rfl:     acetaminophen (TYLENOL) 325 mg tablet, Take 2 tablets (650 mg total) by mouth every 4 (four) hours as needed for mild pain (Patient not taking: Reported on 9/3/2024), Disp: , Rfl:     AMLODIPINE BENZOATE PO, Take by mouth in the morning, Disp: , Rfl:     buPROPion (WELLBUTRIN XL) 300 mg 24 hr tablet, , Disp: , Rfl:     BUPROPION HCL PO, Take by mouth in the morning, Disp: , Rfl:     ketorolac (TORADOL) 10 mg tablet, Take 1 tablet (10 mg total) by mouth every 6 (six) hours as needed for moderate pain for up to 3 days, Disp: 12 tablet, Rfl: 0    lisinopril (ZESTRIL) 20 mg tablet, , Disp: , Rfl:     LISINOPRIL PO, Take by mouth in the morning, Disp: , Rfl:     TRAZODONE HCL PO, Take 50 mg by mouth daily at bedtime as needed (HS PRN), Disp: , Rfl:     No Known Allergies    Objective:  Vitals:    10/29/24 0846   BP: 130/60       The patient was awake, alert, and oriented to person, place, and time.  No acute distress.  Normocephalic.  EOMI.  Mucous membranes moist.  Normal respiratory effort.    Left Hand:  Scars have remodeled nicely. No swelling or ecchymosis. Hand and fingers were warm and well-perfused.  Capillary refill was brisk.  Sensation intact. Full active range of motion of the elbows, forearms, wrists, and fingers.       Mild flexion contracture of long finger PIP which is flexible.  Full composite fist.       Imaging/Diagnostic Studies:    No new images to review.         This document was created using speech voice recognition software.   Grammatical errors, random word insertions, pronoun errors, and incomplete sentences are an occasional consequence of this system due to software limitations, ambient noise, and hardware issues.   Any  formal questions or concerns about content, text, or information contained within the body of this dictation should be directly addressed to the provider for clarification.

## 2024-10-30 ENCOUNTER — APPOINTMENT (OUTPATIENT)
Dept: OCCUPATIONAL THERAPY | Facility: CLINIC | Age: 64
End: 2024-10-30
Payer: COMMERCIAL

## 2024-10-30 NOTE — PROGRESS NOTES
Patient had follow up with referring provider 10/29/2024. Patient demonstrates functional ROM as evidence by making a full composite fist. Patient was instructed to continue HEP at home at this time. Patient to be D/C. Patient has met all goals.

## (undated) DEVICE — DRAPE SHEET X-LG

## (undated) DEVICE — PAD GROUNDING DUAL ADULT

## (undated) DEVICE — SPONGE STICK WITH PVP-I: Brand: KENDALL

## (undated) DEVICE — ABDOMINAL PAD: Brand: DERMACEA

## (undated) DEVICE — SKN PRP WNG SPNGE PVP SCRB STR: Brand: MEDLINE INDUSTRIES, INC.

## (undated) DEVICE — CUFF TOURNIQUET 18 X 4 IN QUICK CONNECT DISP 1 BLADDER

## (undated) DEVICE — INTENDED FOR TISSUE SEPARATION, AND OTHER PROCEDURES THAT REQUIRE A SHARP SURGICAL BLADE TO PUNCTURE OR CUT.: Brand: BARD-PARKER SAFETY BLADES SIZE 10, STERILE

## (undated) DEVICE — STOCKINETTE REGULAR

## (undated) DEVICE — OCCLUSIVE GAUZE STRIP,3% BISMUTH TRIBROMOPHENATE IN PETROLATUM BLEND: Brand: XEROFORM

## (undated) DEVICE — BETHLEHEM UNIV MAJ EXT ,KIT: Brand: CARDINAL HEALTH

## (undated) DEVICE — GAUZE SPONGES,16 PLY: Brand: CURITY

## (undated) DEVICE — PADDING CAST 4 IN  COTTON STRL

## (undated) DEVICE — TIBURON HAND DRAPE: Brand: CONVERTORS

## (undated) DEVICE — GLOVE SRG BIOGEL 7.5

## (undated) DEVICE — DRAPE C-ARM X-RAY

## (undated) DEVICE — KERLIX BANDAGE ROLL: Brand: KERLIX

## (undated) DEVICE — CYSTO TUBING SINGLE IRRIGATION

## (undated) DEVICE — GLOVE INDICATOR PI UNDERGLOVE SZ 8 BLUE

## (undated) DEVICE — SUT VICRYL PLUS 1 CTB-1 36 IN VCPB947H

## (undated) DEVICE — PENCIL ELECTROSURG E-Z CLEAN -0035H